# Patient Record
Sex: MALE | NOT HISPANIC OR LATINO | Employment: FULL TIME | URBAN - METROPOLITAN AREA
[De-identification: names, ages, dates, MRNs, and addresses within clinical notes are randomized per-mention and may not be internally consistent; named-entity substitution may affect disease eponyms.]

---

## 2023-10-03 ENCOUNTER — ANCILLARY PROCEDURE (OUTPATIENT)
Dept: RADIOLOGY | Facility: CLINIC | Age: 33
End: 2023-10-03
Payer: COMMERCIAL

## 2023-10-03 DIAGNOSIS — M54.12 RADICULOPATHY, CERVICAL REGION: ICD-10-CM

## 2023-10-03 PROCEDURE — 72050 X-RAY EXAM NECK SPINE 4/5VWS: CPT | Performed by: RADIOLOGY

## 2023-10-03 PROCEDURE — 72050 X-RAY EXAM NECK SPINE 4/5VWS: CPT

## 2024-02-27 ENCOUNTER — OFFICE VISIT (OUTPATIENT)
Dept: PAIN MEDICINE | Facility: CLINIC | Age: 34
End: 2024-02-27
Payer: COMMERCIAL

## 2024-02-27 ENCOUNTER — HOSPITAL ENCOUNTER (OUTPATIENT)
Dept: RADIOLOGY | Facility: CLINIC | Age: 34
Discharge: HOME | End: 2024-02-27
Payer: COMMERCIAL

## 2024-02-27 VITALS
HEART RATE: 107 BPM | RESPIRATION RATE: 20 BRPM | SYSTOLIC BLOOD PRESSURE: 122 MMHG | HEIGHT: 72 IN | WEIGHT: 298 LBS | BODY MASS INDEX: 40.36 KG/M2 | DIASTOLIC BLOOD PRESSURE: 82 MMHG

## 2024-02-27 DIAGNOSIS — M25.511 CHRONIC RIGHT SHOULDER PAIN: ICD-10-CM

## 2024-02-27 DIAGNOSIS — M54.12 CERVICAL RADICULITIS: ICD-10-CM

## 2024-02-27 DIAGNOSIS — G89.29 CHRONIC RIGHT SHOULDER PAIN: ICD-10-CM

## 2024-02-27 DIAGNOSIS — M67.911 TENDINOPATHY OF RIGHT ROTATOR CUFF: Primary | ICD-10-CM

## 2024-02-27 PROCEDURE — 99204 OFFICE O/P NEW MOD 45 MIN: CPT | Performed by: STUDENT IN AN ORGANIZED HEALTH CARE EDUCATION/TRAINING PROGRAM

## 2024-02-27 PROCEDURE — 99214 OFFICE O/P EST MOD 30 MIN: CPT | Mod: 25 | Performed by: STUDENT IN AN ORGANIZED HEALTH CARE EDUCATION/TRAINING PROGRAM

## 2024-02-27 PROCEDURE — 20611 DRAIN/INJ JOINT/BURSA W/US: CPT | Performed by: STUDENT IN AN ORGANIZED HEALTH CARE EDUCATION/TRAINING PROGRAM

## 2024-02-27 PROCEDURE — 73030 X-RAY EXAM OF SHOULDER: CPT | Mod: RT

## 2024-02-27 PROCEDURE — 2500000004 HC RX 250 GENERAL PHARMACY W/ HCPCS (ALT 636 FOR OP/ED): Performed by: STUDENT IN AN ORGANIZED HEALTH CARE EDUCATION/TRAINING PROGRAM

## 2024-02-27 PROCEDURE — 1036F TOBACCO NON-USER: CPT | Performed by: STUDENT IN AN ORGANIZED HEALTH CARE EDUCATION/TRAINING PROGRAM

## 2024-02-27 RX ORDER — ALBUTEROL SULFATE 90 UG/1
2 AEROSOL, METERED RESPIRATORY (INHALATION) EVERY 6 HOURS PRN
COMMUNITY

## 2024-02-27 RX ORDER — BUPIVACAINE HYDROCHLORIDE 5 MG/ML
3 INJECTION, SOLUTION EPIDURAL; INTRACAUDAL ONCE
Status: COMPLETED | OUTPATIENT
Start: 2024-02-27 | End: 2024-02-27

## 2024-02-27 RX ORDER — CLINDAMYCIN HYDROCHLORIDE 300 MG/1
300 CAPSULE ORAL 3 TIMES DAILY
COMMUNITY
Start: 2023-06-16

## 2024-02-27 RX ORDER — BUSPIRONE HYDROCHLORIDE 10 MG/1
10 TABLET ORAL 2 TIMES DAILY
COMMUNITY
Start: 2024-01-09

## 2024-02-27 RX ORDER — FLUTICASONE PROPIONATE AND SALMETEROL 500; 50 UG/1; UG/1
1 POWDER RESPIRATORY (INHALATION) EVERY 12 HOURS
COMMUNITY

## 2024-02-27 RX ORDER — OMEPRAZOLE 40 MG/1
CAPSULE, DELAYED RELEASE ORAL
COMMUNITY
Start: 2024-02-13

## 2024-02-27 RX ORDER — GABAPENTIN 300 MG/1
300 CAPSULE ORAL 3 TIMES DAILY
COMMUNITY
Start: 2024-02-17 | End: 2024-02-27 | Stop reason: DRUGHIGH

## 2024-02-27 RX ORDER — MULTIVITAMIN
TABLET ORAL EVERY 24 HOURS
COMMUNITY

## 2024-02-27 RX ORDER — TRIAMCINOLONE ACETONIDE 40 MG/ML
40 INJECTION, SUSPENSION INTRA-ARTICULAR; INTRAMUSCULAR ONCE
Status: COMPLETED | OUTPATIENT
Start: 2024-02-27 | End: 2024-02-27

## 2024-02-27 RX ORDER — LORAZEPAM 1 MG/1
TABLET ORAL
COMMUNITY

## 2024-02-27 RX ORDER — METHYLPREDNISOLONE 4 MG/1
TABLET ORAL EVERY 24 HOURS
COMMUNITY
Start: 2022-11-03 | End: 2024-05-23 | Stop reason: WASHOUT

## 2024-02-27 RX ORDER — PREDNISONE 10 MG/1
TABLET ORAL
COMMUNITY
Start: 2024-02-16 | End: 2024-05-23 | Stop reason: WASHOUT

## 2024-02-27 RX ORDER — FLUOXETINE HYDROCHLORIDE 20 MG/1
20 CAPSULE ORAL DAILY
COMMUNITY
Start: 2024-01-10

## 2024-02-27 RX ORDER — BUPIVACAINE HYDROCHLORIDE 5 MG/ML
2 INJECTION, SOLUTION EPIDURAL; INTRACAUDAL ONCE
Status: COMPLETED | OUTPATIENT
Start: 2024-02-27 | End: 2024-02-27

## 2024-02-27 RX ORDER — GABAPENTIN 300 MG/1
CAPSULE ORAL
Qty: 90 CAPSULE | Refills: 2 | Status: SHIPPED | OUTPATIENT
Start: 2024-02-27 | End: 2024-02-27

## 2024-02-27 RX ORDER — MONTELUKAST SODIUM 10 MG/1
10 TABLET ORAL NIGHTLY
COMMUNITY
Start: 2024-01-22 | End: 2025-01-21

## 2024-02-27 RX ORDER — HYDROCODONE BITARTRATE AND ACETAMINOPHEN 5; 325 MG/1; MG/1
1 TABLET ORAL NIGHTLY PRN
Qty: 7 TABLET | Refills: 0 | Status: SHIPPED | OUTPATIENT
Start: 2024-02-27 | End: 2024-03-06 | Stop reason: SDUPTHER

## 2024-02-27 RX ADMIN — BUPIVACAINE HYDROCHLORIDE 15 MG: 5 INJECTION, SOLUTION EPIDURAL; INTRACAUDAL; PERINEURAL at 10:39

## 2024-02-27 RX ADMIN — TRIAMCINOLONE ACETONIDE 40 MG: 40 INJECTION, SUSPENSION INTRA-ARTICULAR; INTRAMUSCULAR at 10:39

## 2024-02-27 RX ADMIN — BUPIVACAINE HYDROCHLORIDE 10 MG: 5 INJECTION, SOLUTION EPIDURAL; INTRACAUDAL; PERINEURAL at 10:38

## 2024-02-27 ASSESSMENT — LIFESTYLE VARIABLES: TOTAL SCORE: 4

## 2024-02-27 ASSESSMENT — PAIN - FUNCTIONAL ASSESSMENT: PAIN_FUNCTIONAL_ASSESSMENT: 0-10

## 2024-02-27 ASSESSMENT — PAIN SCALES - GENERAL
PAINLEVEL_OUTOF10: 10 - WORST POSSIBLE PAIN
PAINLEVEL: 10-WORST PAIN EVER

## 2024-02-27 ASSESSMENT — PATIENT HEALTH QUESTIONNAIRE - PHQ9
2. FEELING DOWN, DEPRESSED OR HOPELESS: NOT AT ALL
SUM OF ALL RESPONSES TO PHQ9 QUESTIONS 1 AND 2: 0
1. LITTLE INTEREST OR PLEASURE IN DOING THINGS: NOT AT ALL

## 2024-02-27 ASSESSMENT — PAIN DESCRIPTION - DESCRIPTORS: DESCRIPTORS: SHARP

## 2024-02-27 NOTE — PROGRESS NOTES
[facility] Pain Management  New Patient Office Visit Note 2024    Patient Information: Lucas Clayton, MRN: [id number], : 1990   Primary Care/Referring Physician: Jake Hoffman, APRN-CNP, [address] / [place] [address]     Chief Complaint: Right neck and shoulder pain  History of Pain: Mr. Lucas Clayton is a 33 y.o. male with a PMHx of GERD, asthma, anxiety, ADD who presents for neck and right shoulder/upper arm pain    He reports onset of pain around 2023. He woke up and just noticed worsening pain in his right neck and shoulder. He works as a  but did not obviously injure himself while working. States he had a cervical spine xray at that time which was unremarkable. He tried PO Prednisone multiple times, with improvement while using it, but states in the past 3 weeks it has worsened again significantly. He describes a sharp pain in his right neck which radiates down to his right shoulder, and occasionally down his right lateral arm to the level of the elbow. He also reports some stiffness in his hands. He denies any tingling or numbness in his right arm. His pain worsens when he coughs and when he tries to raise his arms up (such as when driving)    Current Pain Medications: Gabapentin 300 mg TID - minimal benefit, OTC Ibuprofen - usually 800 mg BID  Previously Tried Pain Medications: Meloxicam - was taking but stopped when he started Gabapentin    Relevant Surgeries: Denies  Injections: Denies  Physical/Occupational Therapy: Denies any recent PT.    Medications:   Current Outpatient Medications   Medication Instructions    albuterol 90 mcg/actuation inhaler 2 puffs, inhalation, Every 6 hours PRN    busPIRone (BUSPAR) 10 mg, oral, 2 times daily    clindamycin (CLEOCIN) 300 mg, oral, 3 times daily    FLUoxetine (PROZAC) 20 mg, oral, Daily    fluticasone propion-salmeteroL (Wixela Inhub) 500-50 mcg/dose diskus inhaler 1 puff, inhalation, Every 12 hours     HYDROcodone-acetaminophen (Norco) 5-325 mg tablet 1 tablet, oral, Nightly PRN    LORazepam (Ativan) 1 mg tablet take one tablet by mouth daily as needed for anxiety (#24=30 day supply per prescriber)    methylPREDNISolone (Medrol Dospak) 4 mg tablets Every 24 hours    montelukast (SINGULAIR) 10 mg, oral, Nightly    multivit with min-folic acid 0.4 mg tablet Every 24 hours    omeprazole (PriLOSEC) 40 mg DR capsule TAKE 1 CAPSULE BY MOUTH 30 MINUTES PRIOR TO MORNING MEAL    predniSONE (Deltasone) 10 mg tablet 40 mg a day for 4 days 30 mg a day for 4 days 20 mg a day for 4 days 10 mg q.day for 4 days 10 mg every other day for 4 doses      Allergies:   Allergies   Allergen Reactions    Penicillins Anaphylaxis       Past Medical & Surgical History:  Past Medical History:   Diagnosis Date    Anxiety     Depression     Neck pain     History reviewed. No pertinent surgical history.    No family history on file.  Social History     Socioeconomic History    Marital status: Single     Spouse name: Not on file    Number of children: Not on file    Years of education: Not on file    Highest education level: Not on file   Occupational History    Not on file   Tobacco Use    Smoking status: Never    Smokeless tobacco: Never   Substance and Sexual Activity    Alcohol use: Never    Drug use: Never    Sexual activity: Not on file   Other Topics Concern    Not on file   Social History Narrative    Not on file     Social Determinants of Health     Financial Resource Strain: Not on file   Food Insecurity: Not on file   Transportation Needs: Not on file   Physical Activity: Not on file   Stress: Not on file   Social Connections: Not on file   Intimate Partner Violence: Not on file   Housing Stability: Not on file       Problems, Past medical history, past surgical history, Medications, allergies, social and family history reviewed and as per the electronic medical record from today's encounter    Review of Systems:  CONST: No fever,  chills, fatigue, weight changes  EYES: No loss of vision  ENT: No hearing loss, tinnitus  CV: No chest pain, palpitations  RESP: No dyspnea, shortness of breath, cough  GI: No stool incontinence, nausea, vomiting  : No urinary incontinence  MSK: No joint swelling  SKIN: No rash, no hives  NEURO: No headache, dizziness, weakness, paresthesias  PSYCH: No anxiety, depression  HEM/LYMPH: No easy bruising or bleeding  All other systems reviewed are negative     Physical Exam:  Vitals: /82   Pulse 107   Resp 20   Ht 1.829 m (6')   Wt 135 kg (298 lb)   BMI 40.42 kg/m²   General: No apparent distress. Alert, appropriate, oriented x 3. Mood and affect normal. Speaking in full sentences.  HENT: Normocephalic, atraumatic. Hearing intact.  Eyes: Extraocular movements grossly intact. Pupils equal and round.   Neck: Supple, trachea midline.  Lungs: Symmetric respiratory excursion on visual exam, nonlabored breathing.  Extremities: No clubbing, cyanosis, or edema noted in arms or legs.  Skin: No rashes, lesions, alopecia noted on back or extremities.   MSK: No significant pain with resisted external shoulder rotation bilaterally. Reports right shoulder pain with empty can test. Reports pain with right shoulder impingement maneuvers.   Neck: Reports right neck pain with extension, rotation in either direction, lateral bending in either direction. Spurling's negative bilaterally. No tenderness over the spinous processes, cervical paraspinal muscles, or bilateral trapezius muscles.  Neuro: Alert and appropriate. Motor strength 5/5 throughout bilateral upper extremities. Sensory intact to light touch bilateral upper extremities. Gait within normal limits. Bulk and tone within normal limits.    Laboratory Data:  The following laboratory data were reviewed during this visit:   Lab Results   Component Value Date    WBC 10.3 09/03/2021    RBC 5.30 09/03/2021    HGB 16.4 09/03/2021    HCT 48.4 09/03/2021     09/03/2021  "     No results found for: \"INR\"  Lab Results   Component Value Date    CREATININE 0.9 09/03/2021       Imaging:  The following imaging impressions were reviewed by me during this visit:    -10/3/23 cervical spine xray overall unremarkable     I also personally reviewed the images from the above studies myself. These images and my interpretation of them contributed to the management and decision making of the patient's medical plan.    ASSESSMENT:  Mr. Lucas Clayton is a 33 y.o. male with right neck and shoulder/upper arm pain that is consistent with:    1. Tendinopathy of right rotator cuff    2. Cervical radiculitis    3. Chronic right shoulder pain        PLAN:    Diagnostics:   - I reviewed his cervical spine xray which was unremarkable. Recommend a right shoulder xray    Physical Therapy and Rehabilitation:     - Referral to PT for his neck and right shoulder provided today    Psychologically:  - No needs at this time    Medications  - No changes to medication today. He has tried multiple NSAID's including Meloxicam, Diclofenac, and PO steroids without durable pain relief    Duration  - 5 months    Interventions:  - I suspect his pain is secondary to right rotator cuff tendinopathy with some possibility of cervical radiculitis. His pain remains fairly severe and is affecting his sleep and ability to work. He has not improved in 5 months with conservative care including activity modification and NSAID's. At this juncture, I believe he would benefit from an ultrasound guided right subacromial bursa injection. Risks, benefits, and alternatives discussed. He would like to proceed. This was performed today, as noted below    Joint Injection/Aspiration    Date/Time: 2/27/2024 10:25 AM    Performed by: Ayo Hawthorne MD  Authorized by: Ayo Hawthorne MD    Consent:     Consent obtained:  Written    Consent given by:  Patient    Risks, benefits, and alternatives were discussed: yes      Risks discussed:  Bleeding, " infection and pain    Alternatives discussed:  Alternative treatment  Universal protocol:     Procedure explained and questions answered to patient or proxy's satisfaction: yes      Relevant documents present and verified: yes      Test results available: no      Imaging studies available: yes      Required blood products, implants, devices, and special equipment available: yes      Site/side marked: yes      Immediately prior to procedure, a time out was called: yes      Patient identity confirmed:  Verbally with patient  Location:     Location:  Shoulder    Shoulder:  R subacromial bursa  Anesthesia:     Anesthesia method:  Local infiltration    Local anesthetic:  Bupivacaine 0.5% w/o epi  Procedure details:     Preparation: Patient was prepped and draped in usual sterile fashion      Needle gauge:  22 G    Ultrasound guidance: yes      Approach:  Anterior    Steroid injected: yes      Specimen collected: no    Post-procedure details:     Dressing:  Adhesive bandage    Procedure completion:  Tolerated well, no immediate complications  Comments:      Ultrasound guided right subacromial bursa injection  After informed written consent was obtained, the patient was placed in the sitting position with the right arm resting in the lap. The correct site and laterality were confirmed with the patient and marked. The skin was prepped with chlorhexidine, allowed to dry for a minimum of 3 minutes, and draped in a sterile fashion.    A linear ultrasound probe (10-12mHz) was positioned over the left shoulder. The supraspinatus tendon and the subacromial space and bursa were located. Next, a 25 gauge 1.5 inch needle was used to anesthesize the skin with 2 mL of bupivacaine 0.5%. Next a 22 gauge 1.5 inch needle was advanced slowly until its tip was visualized under ultrasound to be in the right subacromial/subdeltoid bursal space. Next, a mixture of 40 mg triamcinolone diluted in 3 mL of bupivacaine 0.5% was injected and the  needle was removed.   The skin was cleansed and a sterile bandage was applied. The patient tolerated the procedure well and no complications were encountered.    Performed by: Ayo Hawthorne MD  Authorized by: Ayo Hawthorne MD      Comments: Ultrasound guidance was used for right subacromial bursa injection        Sincerely,  Ayo Hawthorne MD  [facility] - [place]

## 2024-03-05 ENCOUNTER — TELEPHONE (OUTPATIENT)
Dept: PAIN MEDICINE | Facility: CLINIC | Age: 34
End: 2024-03-05
Payer: COMMERCIAL

## 2024-03-05 DIAGNOSIS — M67.911 TENDINOPATHY OF RIGHT ROTATOR CUFF: ICD-10-CM

## 2024-03-05 DIAGNOSIS — M54.12 CERVICAL RADICULITIS: ICD-10-CM

## 2024-03-05 DIAGNOSIS — G89.29 CHRONIC RIGHT SHOULDER PAIN: ICD-10-CM

## 2024-03-05 DIAGNOSIS — M25.511 CHRONIC RIGHT SHOULDER PAIN: ICD-10-CM

## 2024-03-05 NOTE — TELEPHONE ENCOUNTER
Phone call from patient stating his shoulder hasn't improved any. (He had his xray done 2/27/24).

## 2024-03-06 RX ORDER — HYDROCODONE BITARTRATE AND ACETAMINOPHEN 5; 325 MG/1; MG/1
1 TABLET ORAL EVERY 8 HOURS PRN
Qty: 21 TABLET | Refills: 0 | Status: SHIPPED | OUTPATIENT
Start: 2024-03-06 | End: 2024-03-13

## 2024-03-06 RX ORDER — TIZANIDINE 4 MG/1
4-8 TABLET ORAL 2 TIMES DAILY PRN
Qty: 60 TABLET | Refills: 1 | Status: SHIPPED | OUTPATIENT
Start: 2024-03-06 | End: 2024-04-24 | Stop reason: SDUPTHER

## 2024-03-06 NOTE — TELEPHONE ENCOUNTER
Called patient to discuss. Will provide an additional small Rx for Norco to help with pain and sleep, as well as Tizanidine. He is planning to start PO on 3/20/24

## 2024-03-19 NOTE — PROGRESS NOTES
Physical Therapy Evaluation    Patient Name: Lucas Clayton  MRN: [id number]  Evaluation Date: 3/20/2024  Time Calculation  Start Time: 1805  Stop Time: 1855  Time Calculation (min): 50 min  PT Evaluation Time Entry  PT Evaluation (Moderate) Time Entry: 25     PT Therapeutic Procedures Time Entry  Therapeutic Exercise Time Entry: 15    1. Tendinopathy of right rotator cuff  Referral to Physical Therapy      2. Cervical radiculitis  Referral to Physical Therapy           Subjective   General:  Patient is a 32 yo male  with diagnosis of right RC tendinopathy.  Patient with chief complaint of right shoulder and neck pain.  Patient reports 6 month history of symptoms.  Patient reports waking with stiff neck right > left  6 months ago.  Patient with improvement with oral steroids; no relief with injection.  Patient continues to reports right sided cervical and shoulder symptoms  Patient is motivated to participate.    Precautions:  none    Relevant PMH:  GERD  Asthma  Anxiety  ADD      Pain:  Neck/shoulder   Worse 10/10- PM, lifting at work  Better 7/10- at rest, off days     Home Living:       Occupation: full time job doing   Work tasks: lifting/repetition  Hobbies/activities: working on cars    Prior Function Per Pt/Caregiver Report:  Physical work day  Works on cars for recreation      Imaging:  X-ray cervical spine  FINDINGS:   No acute fracture or focal malalignment. Minimal multilevel   uncovertebral hypertrophy and facet arthropathy     X-ray right shoulder  IMPRESSION:  No evidence for acute osseous abnormality. No significant bony  abnormality of the right shoulder is identified.      Objective   Posture:  FHP     Range of Motion:  Cervical AROM Range   Flexion WNL   Extension 25% limited- pain   R rotation 48 degrees - pain   L rotation 50 degrees - pain   R sidebend 25 degrees- pain   L sidebend 30 degrees      Shoulder AROM L R   Flexion WFL deg 140 deg   Extension WFL deg WFL deg   Abduction WFL deg  120 deg   External Rotation 85 deg 80 deg   Internal Rotation T10 deg T10 deg         Strength:  Shoulder MMT L R   Flexion 4+/5 4/5   Extension 5/5 5/5   Abduction 4+/5 4/5   External Rotation 5/5 5/5   Internal Rotation 5/5 5/5      Right 62  Left 76 psi  Flexibility:     Palpation:  ++ tenderness to palpate right UT/lateral shoulder     Special Tests:  Full can - negative  Empty can- positive     Outcome Measures:  SPADI  97/100    Assessment  Pt is a 33 y.o. male who presents with impairments of right shoulder pain, limited ROM and RC weakness. These impairments have led to functional limitations including impaired work day/recreation. Pt would benefit from skilled physical therapy intervention to improve above impairments and facilitate return to function.    Plan  1x/week  Up to 7 visits  Therapeutic exercise  Manual/dry needling  Ultrasound    Plan for next visit:   Add US/manual  Progress HEP/therapeutic exercise    OP EDUCATION:  HEP    Today's Treatment:  Therapeutic Exercise  HEP to be completed daily, exercises include:  Door way stretch for chest/shoulders  GTB standing bilateral ER  GTB standing bilateral HABD      Goals:  STG(3 visits)   Independent with HEP    LTG(7 visits)   SPADI to < 25 % impaired   MMT right RC elevation to 4+/5   Patient to perform ADLs/work with pain <3/10   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

## 2024-03-20 ENCOUNTER — EVALUATION (OUTPATIENT)
Dept: PHYSICAL THERAPY | Facility: CLINIC | Age: 34
End: 2024-03-20
Payer: COMMERCIAL

## 2024-03-20 DIAGNOSIS — M54.12 CERVICAL RADICULITIS: ICD-10-CM

## 2024-03-20 DIAGNOSIS — M67.911 TENDINOPATHY OF RIGHT ROTATOR CUFF: Primary | ICD-10-CM

## 2024-03-20 PROCEDURE — 97162 PT EVAL MOD COMPLEX 30 MIN: CPT | Mod: GP

## 2024-03-20 PROCEDURE — 97110 THERAPEUTIC EXERCISES: CPT | Mod: GP

## 2024-03-26 NOTE — PROGRESS NOTES
[facility] Pain Management  Follow Up Office Visit Note 3/27/2024    Patient Information: Lucas Clayton, MRN: [id number], : 1990   Primary Care/Referring Physician: Jake Hoffman, APRN-CNP, [address] / [place] [address]     Chief Complaint: Right neck and shoulder pain    Interval History: Mr. Clayton presents today for follow up. At hist last visit I performed a right subacromial bursa injection, referred him to PT, and ordered a right shoulder xray today    Today he reports no changes since last seen. His pain did not improve at all after subacromial bursa injection. He had his initial evaluation with PT but can't start until late April, so he is doing exercises at home. I also started Tizanidine in the interim which is providing mild pain relief with no side effects. His shoulder xray was unremarkable    He has restarted Meloxicam which is improving some of his hand pain but not his shoulder pain    Brief History of Pain: Mr. Lucas Clayton is a 33 y.o. male with a PMHx of GERD, asthma, anxiety, ADD who presents for neck and right shoulder/upper arm pain    He reports onset of pain around 2023. He woke up and just noticed worsening pain in his right neck and shoulder. He works as a  but did not obviously injure himself while working. States he had a cervical spine xray at that time which was unremarkable. He tried PO Prednisone multiple times, with improvement while using it, but states in the past 3 weeks it has worsened again significantly. He describes a sharp pain in his right neck which radiates down to his right shoulder, and occasionally down his right lateral arm to the level of the elbow. He also reports some stiffness in his hands. He denies any tingling or numbness in his right arm. His pain worsens when he coughs and when he tries to raise his arms up (such as when driving)    Current Pain Medications: Meloxicam - takes this 2-3 times a week for his hand pain, Ibuprofen  600 mg at most once daily, Tizanidine 4-8 mg BID PRN - take 4 mg BID  Previously Tried Pain Medications: Gabapentin - minimal pain relief    Relevant Surgeries: Denies  Injections: Right subacromial bursa injection - minimal improvement  Physical/Occupational Therapy: Has started PT and is doing exercises at home    Medications:   Current Outpatient Medications   Medication Instructions    albuterol 90 mcg/actuation inhaler 2 puffs, inhalation, Every 6 hours PRN    busPIRone (BUSPAR) 10 mg, oral, 2 times daily    clindamycin (CLEOCIN) 300 mg, oral, 3 times daily    FLUoxetine (PROZAC) 20 mg, oral, Daily    fluticasone propion-salmeteroL (Wixela Inhub) 500-50 mcg/dose diskus inhaler 1 puff, inhalation, Every 12 hours    HYDROcodone-acetaminophen (Norco) 5-325 mg tablet 1 tablet, oral, Every 6 hours PRN    LORazepam (Ativan) 1 mg tablet take one tablet by mouth daily as needed for anxiety (#24=30 day supply per prescriber)    methylPREDNISolone (Medrol Dospak) 4 mg tablets Every 24 hours    montelukast (SINGULAIR) 10 mg, oral, Nightly    multivit with min-folic acid 0.4 mg tablet Every 24 hours    omeprazole (PriLOSEC) 40 mg DR capsule TAKE 1 CAPSULE BY MOUTH 30 MINUTES PRIOR TO MORNING MEAL    predniSONE (Deltasone) 10 mg tablet 40 mg a day for 4 days 30 mg a day for 4 days 20 mg a day for 4 days 10 mg q.day for 4 days 10 mg every other day for 4 doses    tiZANidine (ZANAFLEX) 4-8 mg, oral, 2 times daily PRN      Allergies:   Allergies   Allergen Reactions    Penicillins Anaphylaxis    Doxycycline Rash       Past Medical & Surgical History:  Past Medical History:   Diagnosis Date    Anxiety     Depression     Neck pain     History reviewed. No pertinent surgical history.    Family History   Problem Relation Name Age of Onset    Breast cancer Mother      Asthma Father       Social History     Socioeconomic History    Marital status: Single     Spouse name: Not on file    Number of children: Not on file    Years of  education: Not on file    Highest education level: Not on file   Occupational History    Not on file   Tobacco Use    Smoking status: Never    Smokeless tobacco: Never   Substance and Sexual Activity    Alcohol use: Never    Drug use: Never    Sexual activity: Not on file   Other Topics Concern    Not on file   Social History Narrative    Not on file     Social Determinants of Health     Financial Resource Strain: Not on file   Food Insecurity: Not on file   Transportation Needs: Not on file   Physical Activity: Not on file   Stress: Not on file   Social Connections: Not on file   Intimate Partner Violence: Not on file   Housing Stability: Not on file       Problems, Past medical history, past surgical history, Medications, allergies, social and family history reviewed and as per the electronic medical record from today's encounter    Review of Systems:  CONST: No fever, chills, fatigue, weight changes  EYES: No loss of vision  ENT: No hearing loss, tinnitus  CV: No chest pain, palpitations  RESP: No dyspnea, shortness of breath, cough  GI: No stool incontinence, nausea, vomiting  : No urinary incontinence  MSK: No joint swelling  SKIN: No rash, no hives  NEURO: No headache, dizziness, weakness, paresthesias  PSYCH: No anxiety, depression  HEM/LYMPH: No easy bruising or bleeding  All other systems reviewed are negative     Physical Exam:  Vitals: /79   Pulse 97   Resp 20   Ht 1.829 m (6')   Wt 135 kg (298 lb)   SpO2 98%   BMI 40.42 kg/m²   General: No apparent distress. Alert, appropriate, oriented x 3. Mood and affect normal. Speaking in full sentences.  HENT: Normocephalic, atraumatic. Hearing intact.  Eyes: Extraocular movements grossly intact. Pupils equal and round.   Neck: Supple, trachea midline.  Lungs: Symmetric respiratory excursion on visual exam, nonlabored breathing.  Extremities: No clubbing, cyanosis, or edema noted in arms or legs.  Skin: No rashes, lesions, alopecia noted on back or  "extremities.   MSK: No significant pain with resisted external shoulder rotation bilaterally. Reports right shoulder pain with empty can test. Reports pain with right shoulder impingement maneuvers.   Neuro: Alert and appropriate. Gait within normal limits. Bulk and tone within normal limits.    Laboratory Data:  The following laboratory data were reviewed during this visit:   Lab Results   Component Value Date    WBC 10.3 09/03/2021    RBC 5.30 09/03/2021    HGB 16.4 09/03/2021    HCT 48.4 09/03/2021     09/03/2021      No results found for: \"INR\"  Lab Results   Component Value Date    CREATININE 0.9 09/03/2021       Imaging:  The following imaging impressions were reviewed by me during this visit:    -2/24/24 right shoulder xray overall unremarkable  -10/3/23 cervical spine xray overall unremarkable     I also personally reviewed the images from the above studies myself. These images and my interpretation of them contributed to the management and decision making of the patient's medical plan.    ASSESSMENT:  Mr. Lucas Clayton is a 33 y.o. male with right neck and shoulder/upper arm pain that is consistent with:    1. Chronic right shoulder pain    2. Tendinopathy of right rotator cuff    3. Primary osteoarthritis, right shoulder        PLAN:    Diagnostics:   - I reviewed his cervical spine xray and right shoulder xray which was unremarkable. Would consider a right shoulder MRI in the future if his pain does not improve with additional treatment, to evaluate for labral tear or rotator cuff tendinopathy    Physical Therapy and Rehabilitation:     - Referral to PT for his neck and right shoulder provided previously. He has had his initial evaluation but can't start regular therapy until the end of 04/2024. He is doing PT directed home exercises currently    Psychologically:  - No needs at this time    Medications  - No changes to medication today. He has tried multiple NSAID's including Meloxicam, Diclofenac, " and PO steroids without durable pain relief. I did provide him one additional prescription for Norco but will not provide any additional refills of this medication.     Duration  - 6 months    Interventions:  - I suspect his pain is secondary to right rotator cuff tendinopathy with some possibility of labral tear or cervical radiculitis. His pain remains fairly severe and is affecting his sleep and ability to work. He has not improved in 5 months with conservative care including activity modification and NSAID's. He underwent a right subacromial bursa injection with minimal improvement in his pain. At this juncture, I believe he would benefit from a right intra-articular shoulder joint injection utilizing live fluoroscopy and local anesthesia. Risks, benefits, and alternatives discussed. He would like to proceed.          Sincerely,  Ayo Hawthorne MD  [facility] - [place]

## 2024-03-27 ENCOUNTER — OFFICE VISIT (OUTPATIENT)
Dept: PAIN MEDICINE | Facility: CLINIC | Age: 34
End: 2024-03-27
Payer: COMMERCIAL

## 2024-03-27 VITALS
HEART RATE: 97 BPM | HEIGHT: 72 IN | WEIGHT: 298 LBS | RESPIRATION RATE: 20 BRPM | BODY MASS INDEX: 40.36 KG/M2 | OXYGEN SATURATION: 98 % | SYSTOLIC BLOOD PRESSURE: 140 MMHG | DIASTOLIC BLOOD PRESSURE: 79 MMHG

## 2024-03-27 DIAGNOSIS — M19.011 PRIMARY OSTEOARTHRITIS, RIGHT SHOULDER: ICD-10-CM

## 2024-03-27 DIAGNOSIS — G89.29 CHRONIC RIGHT SHOULDER PAIN: Primary | ICD-10-CM

## 2024-03-27 DIAGNOSIS — M67.911 TENDINOPATHY OF RIGHT ROTATOR CUFF: ICD-10-CM

## 2024-03-27 DIAGNOSIS — M25.511 CHRONIC RIGHT SHOULDER PAIN: Primary | ICD-10-CM

## 2024-03-27 PROBLEM — F41.0 PANIC ATTACK: Status: ACTIVE | Noted: 2024-03-27

## 2024-03-27 PROBLEM — H66.001 NON-RECURRENT ACUTE SUPPURATIVE OTITIS MEDIA OF RIGHT EAR WITHOUT SPONTANEOUS RUPTURE OF TYMPANIC MEMBRANE: Status: ACTIVE | Noted: 2023-12-12

## 2024-03-27 PROBLEM — J45.909 ASTHMA (HHS-HCC): Status: ACTIVE | Noted: 2023-07-06

## 2024-03-27 PROBLEM — R37 SEXUAL DYSFUNCTION: Status: ACTIVE | Noted: 2023-09-28

## 2024-03-27 PROBLEM — G47.26 SHIFT WORK SLEEP DISORDER: Status: ACTIVE | Noted: 2024-03-27

## 2024-03-27 PROBLEM — R41.840 ATTENTION DISTURBANCE: Status: ACTIVE | Noted: 2024-03-27

## 2024-03-27 PROBLEM — M54.9 CHRONIC BACK PAIN: Status: ACTIVE | Noted: 2023-09-28

## 2024-03-27 PROBLEM — M54.40 ACUTE BACK PAIN WITH SCIATICA: Status: ACTIVE | Noted: 2024-03-27

## 2024-03-27 PROBLEM — F41.9 ANXIETY: Status: ACTIVE | Noted: 2023-07-06

## 2024-03-27 PROBLEM — J45.30 MILD PERSISTENT ASTHMA WITHOUT COMPLICATION (HHS-HCC): Status: ACTIVE | Noted: 2024-03-27

## 2024-03-27 PROBLEM — J06.9 UPPER RESPIRATORY TRACT INFECTION: Status: ACTIVE | Noted: 2023-12-12

## 2024-03-27 PROBLEM — R03.0 ELEVATED BP WITHOUT DIAGNOSIS OF HYPERTENSION: Status: ACTIVE | Noted: 2023-12-12

## 2024-03-27 PROBLEM — G47.26 CIRCADIAN RHYTHM SLEEP DISORDER, SHIFT WORK TYPE: Status: ACTIVE | Noted: 2024-03-27

## 2024-03-27 PROBLEM — F98.8 ADD (ATTENTION DEFICIT DISORDER) WITHOUT HYPERACTIVITY: Status: ACTIVE | Noted: 2023-07-06

## 2024-03-27 PROBLEM — F17.200 SMOKER: Status: ACTIVE | Noted: 2023-09-28

## 2024-03-27 PROCEDURE — 1036F TOBACCO NON-USER: CPT | Performed by: STUDENT IN AN ORGANIZED HEALTH CARE EDUCATION/TRAINING PROGRAM

## 2024-03-27 PROCEDURE — 99214 OFFICE O/P EST MOD 30 MIN: CPT | Performed by: STUDENT IN AN ORGANIZED HEALTH CARE EDUCATION/TRAINING PROGRAM

## 2024-03-27 RX ORDER — HYDROCODONE BITARTRATE AND ACETAMINOPHEN 5; 325 MG/1; MG/1
1 TABLET ORAL EVERY 6 HOURS PRN
Qty: 28 TABLET | Refills: 0 | Status: SHIPPED | OUTPATIENT
Start: 2024-03-27 | End: 2024-04-03

## 2024-03-27 ASSESSMENT — PAIN SCALES - GENERAL
PAINLEVEL: 8
PAINLEVEL_OUTOF10: 8

## 2024-03-27 ASSESSMENT — PAIN - FUNCTIONAL ASSESSMENT: PAIN_FUNCTIONAL_ASSESSMENT: 0-10

## 2024-03-27 ASSESSMENT — PATIENT HEALTH QUESTIONNAIRE - PHQ9
SUM OF ALL RESPONSES TO PHQ9 QUESTIONS 1 AND 2: 0
1. LITTLE INTEREST OR PLEASURE IN DOING THINGS: NOT AT ALL
2. FEELING DOWN, DEPRESSED OR HOPELESS: NOT AT ALL

## 2024-03-27 ASSESSMENT — PAIN DESCRIPTION - DESCRIPTORS: DESCRIPTORS: ACHING;SHARP

## 2024-03-27 NOTE — PROGRESS NOTES
MEDICATION NAME: Norco  STRENGTH: 5/325 mg  LAST FILL DATE: 2024  DATE LAST TAKEN: 2024  QUANTITY FILLED: 21  QUANTITY REMAININ  COUNT COMPLETED BY: SERGIO RUIZ MA and JOSE MCCONNELL RN      UDS LAST COMPLETED:   CONTROLLED SUBSTANCES AGREEMENT LAST SIGNED:   ORT LAST COMPLETED:  Modified [employer] disability form filled out annually.

## 2024-03-29 ENCOUNTER — HOSPITAL ENCOUNTER (OUTPATIENT)
Dept: RADIOLOGY | Facility: CLINIC | Age: 34
Discharge: HOME | End: 2024-03-29
Payer: COMMERCIAL

## 2024-03-29 ENCOUNTER — APPOINTMENT (OUTPATIENT)
Dept: RADIOLOGY | Facility: CLINIC | Age: 34
End: 2024-03-29
Payer: COMMERCIAL

## 2024-03-29 DIAGNOSIS — M25.511 PAIN IN RIGHT SHOULDER: ICD-10-CM

## 2024-04-12 ENCOUNTER — OUTSIDE PROCEDURE (OUTPATIENT)
Dept: PAIN MEDICINE | Facility: CLINIC | Age: 34
End: 2024-04-12

## 2024-04-12 ENCOUNTER — HOSPITAL ENCOUNTER (OUTPATIENT)
Dept: RADIOLOGY | Facility: CLINIC | Age: 34
Discharge: HOME | End: 2024-04-12
Payer: COMMERCIAL

## 2024-04-12 DIAGNOSIS — M25.511 ACUTE PAIN OF RIGHT SHOULDER: Primary | ICD-10-CM

## 2024-04-12 DIAGNOSIS — M25.511 PAIN IN RIGHT SHOULDER: ICD-10-CM

## 2024-04-12 DIAGNOSIS — M67.911 TENDINOPATHY OF RIGHT ROTATOR CUFF: ICD-10-CM

## 2024-04-12 DIAGNOSIS — M67.911 DISORDER OF RIGHT ROTATOR CUFF: ICD-10-CM

## 2024-04-12 PROCEDURE — 20610 DRAIN/INJ JOINT/BURSA W/O US: CPT | Performed by: STUDENT IN AN ORGANIZED HEALTH CARE EDUCATION/TRAINING PROGRAM

## 2024-04-12 NOTE — PROGRESS NOTES
Procedure Note: 2024    PROCEDURE NAME: Right shoulder joint injection    Patient Information: Lucas Clayton, MRN (from [facility]) [id number], : 1990  Chief Complaint: Right shoulder pain    Pain Diagnosis: The diagnosis of Acute pain of right shoulder    Disorder of right rotator cuff  has been made given the patient's clinical evaluation at prior evaluation.      DESCRIPTION OF PROCEDURE:    Lucas Clayton was brought to the procedure room. The assistant obtained vital signs, which were found to be stable. He was then informed of the procedure, its benefits, and its risks, and his questions were answered regarding the procedure. Written informed consent was obtained from the patient. Immediately prior to starting the procedure, a time-out safety check was conducted and the patient's identification, procedure name, and procedure site were confirmed with the patient.    Right Glenohumeral Joint Injection:  After informed written consent was obtained, the patient was placed in supine position. Monitoring of pulse oximetry, heart rate, and blood pressure was done pre-procedure, and post-procedure. During the procedure the patient was monitored with continuous pulse-ox. A time out was performed before the beginning of the procedure. The correct site and laterality were marked. The skin was prepped with chlorhexidine, allowed to dry for 3 minutes, and draped in a sterile fashion    AP fluoroscopy was used to identify and trang the right glenohumeral joint, with the target overlying the humeral head. Next, a 25-gauge 1.5 inch needle was used to inject 2 mL of 1% lidocaine into the overlying skin and musculature. A 3.5 inch 22 gauge spinal needle was then advanced until bone contacted and then slightly retracted. Next, after negative aspiration for blood, 0.5 mL of Omnipaque contrast was injected to confirm spread along the joint space. Then 4 mL of a mixture of 40 mg of triamcinolone (40 mg/mL) diluted in 4 mL of  ropivacaine 0.2% was injected. The needle was withdrawn      Anesthesia: local anesthesia   Complications: none      Ayo Hawthorne MD

## 2024-04-19 ENCOUNTER — APPOINTMENT (OUTPATIENT)
Dept: PHYSICAL THERAPY | Facility: CLINIC | Age: 34
End: 2024-04-19
Payer: COMMERCIAL

## 2024-04-19 NOTE — PROGRESS NOTES
Physical Therapy Treatment    Patient Name: Lucas Clayton  MRN: [id number]  Encounter date:  4/19/2024             Visit Number:  Visit count could not be calculated. Make sure you are using a visit which is associated with an episode. (including evaluation)  Planned total visits: ***  Visits Authorized/Insurance Coverage:  ***    Current Problem  Problem List Items Addressed This Visit    None      Surgery  ***    Surgery date:  ***    Precautions       Pain       Subjective  General  Patient for injection under flouroscopy 4/12        ***    Objective  ***  Extremity/Trunk Assessment  {Extremity/Trunk Assessments:60095}    {Spine,UE,LE,HAND,LYMPHEDEMA:67108}    Treatment:  {PT Treatments:50124}  ***    Aquatic Therapy:        Current HEP:  ***    Activity tolerance:  {Activity:63038}    OP EDUCATION:       Assessment:     Pt's response to treatment:  ***  Areas of improvements:  ***  Limitations/deficits:  ***    Pain end of session: ***    Plan:     {BASPLAN:70063}    Assessment of current progress against goals:  {BASPTNOTEGOALASSESSMENT:52505}    Goals:

## 2024-04-23 PROBLEM — M25.511 ACUTE PAIN OF RIGHT SHOULDER: Status: ACTIVE | Noted: 2024-04-23

## 2024-04-23 PROBLEM — M67.911 DISORDER OF RIGHT ROTATOR CUFF: Status: ACTIVE | Noted: 2024-04-23

## 2024-04-23 NOTE — PROGRESS NOTES
Subjective   Patient ID: Lucas Clayton is a 33 y.o. male who presents for Pain Management.    HPI 34 YO Male with Chronic Right Shoulder Pain, Tendinopathy of the right rotator cuff and Primary OA of the right shoulder. He presents as a follow-up to his Right Glenohumeral Joint Injection by Dr. Hawthorne on 4/12/2024.  Lucas reports 95% relief to his right shoulder.  He does continue to have back spasms for which she is asking for a refill of his tizanidine.    Review of Systems Unless noted in the HPI all other systems have been reviewed and are negative for complaint.    Objective   Physical Exam  General- No acute distress, well appearing and well nourished. Obese  Eyes Conjunctiva and lids: No erythema, swelling or discharge  Neck - Supple, no cervical lymphadenopathy.   Pulmonary - Respiratory effort: Normal respiration.   Cardiovascular - Normal rate and rhythm.  Examination of extremities for edema and/or varicosities: No peripheral edema  Abdomen: Soft, Non-tender, non-distended, no abdominal masses.   Musculoskeletal - Some crepitus to the right shoulder but ROM intact.  Skin - Skin and subcutaneous tissue: Normal without rashes or lesions.  Neurologic - Reflexes: Normal. Coordination: Normal gait   Psychiatric - Orientation to person, place, and time: Normal. Mood and affect: Normal.    Assessment/Plan   Problem List Items Addressed This Visit       Acute pain of right shoulder - Primary    Disorder of right rotator cuff     Other Visit Diagnoses       Chronic right shoulder pain        Relevant Medications    tiZANidine (Zanaflex) 4 mg tablet          TREATMENT PLAN:  I had a nice discussion with the patient today and our plan will be as follows:  Radiology: No new imaging to review at this time.   Physically: Encouraged patient to continue with increased physical activity as able.   Psychologically: No acute psychological needs at this time.    Medication: Had been given Infinisource by [facility] but last note said no  additional fills.  Will refill tizanidine as previously ordered.  Duration: >6 mo  Intervention: Patient is s/p Right Glenohumeral Joint Injection by Dr. Hawthorne on 4/12/2024 with 95% improvement in pain.  Patient will follow-up with us on an as-needed basis.

## 2024-04-24 ENCOUNTER — OFFICE VISIT (OUTPATIENT)
Dept: PAIN MEDICINE | Facility: CLINIC | Age: 34
End: 2024-04-24
Payer: COMMERCIAL

## 2024-04-24 ENCOUNTER — APPOINTMENT (OUTPATIENT)
Dept: PHYSICAL THERAPY | Facility: CLINIC | Age: 34
End: 2024-04-24
Payer: COMMERCIAL

## 2024-04-24 VITALS
HEIGHT: 72 IN | DIASTOLIC BLOOD PRESSURE: 78 MMHG | OXYGEN SATURATION: 98 % | HEART RATE: 97 BPM | BODY MASS INDEX: 40.36 KG/M2 | SYSTOLIC BLOOD PRESSURE: 142 MMHG | WEIGHT: 298 LBS

## 2024-04-24 DIAGNOSIS — M25.511 CHRONIC RIGHT SHOULDER PAIN: ICD-10-CM

## 2024-04-24 DIAGNOSIS — M25.511 ACUTE PAIN OF RIGHT SHOULDER: Primary | ICD-10-CM

## 2024-04-24 DIAGNOSIS — M67.911 DISORDER OF RIGHT ROTATOR CUFF: ICD-10-CM

## 2024-04-24 DIAGNOSIS — G89.29 CHRONIC RIGHT SHOULDER PAIN: ICD-10-CM

## 2024-04-24 PROCEDURE — 99214 OFFICE O/P EST MOD 30 MIN: CPT | Performed by: NURSE PRACTITIONER

## 2024-04-24 PROCEDURE — 1036F TOBACCO NON-USER: CPT | Performed by: NURSE PRACTITIONER

## 2024-04-24 RX ORDER — TIZANIDINE 4 MG/1
8 TABLET ORAL 2 TIMES DAILY PRN
Qty: 120 TABLET | Refills: 1 | Status: SHIPPED | OUTPATIENT
Start: 2024-04-24 | End: 2024-05-24

## 2024-04-24 ASSESSMENT — PATIENT HEALTH QUESTIONNAIRE - PHQ9
2. FEELING DOWN, DEPRESSED OR HOPELESS: NOT AT ALL
1. LITTLE INTEREST OR PLEASURE IN DOING THINGS: NOT AT ALL
SUM OF ALL RESPONSES TO PHQ9 QUESTIONS 1 AND 2: 0

## 2024-04-24 ASSESSMENT — PAIN - FUNCTIONAL ASSESSMENT: PAIN_FUNCTIONAL_ASSESSMENT: 0-10

## 2024-04-24 ASSESSMENT — PAIN SCALES - GENERAL
PAINLEVEL: 3
PAINLEVEL_OUTOF10: 0 - NO PAIN

## 2024-04-24 ASSESSMENT — PAIN DESCRIPTION - DESCRIPTORS: DESCRIPTORS: ACHING

## 2024-04-24 NOTE — PROGRESS NOTES
Physical Therapy Treatment    Patient Name: Lucas Clayton  MRN: [id number]  Encounter date:  4/24/2024             Visit Number:  Visit count could not be calculated. Make sure you are using a visit which is associated with an episode. (including evaluation)  Planned total visits: 7  Visits Authorized/Insurance Coverage:  1500 DED- NOT MET , 80/20 COVERAGE, 30V PT/OT/ ST/CARDIO, AUXIANT NO AUTH REF# [id number]    Current Problem  Problem List Items Addressed This Visit    None        Precautions  none    Pain       Subjective  General  Patients s/p injection under flouroscopy 4/12        ***    Objective  ***  Extremity/Trunk Assessment  {Extremity/Trunk Assessments:53886}    {Spine,UE,LE,HAND,LYMPHEDEMA:03718}    Treatment:  Ultrasound    Manual  Current HEP:    Therapeutic Exercise  HEP to be completed daily, exercises include:  Door way stretch for chest/shoulders  GTB standing bilateral ER  GTB standing bilateral HABD       Activity tolerance:  {Activity:53250}    OP EDUCATION:       Assessment:     Pt's response to treatment:  ***  Areas of improvements:  ***  Limitations/deficits:  ***    Pain end of session: ***    Plan:     {BASPLAN:19784}    Assessment of current progress against goals:  {BASPTNOTEGOALASSESSMENT:07566}       Goals:  STG(3 visits)   Independent with HEP     LTG(7 visits)   SPADI to < 25 % impaired   MMT right RC elevation to 4+/5   Patient to perform ADLs/work with pain <3/10   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

## 2024-05-01 ENCOUNTER — TREATMENT (OUTPATIENT)
Dept: PHYSICAL THERAPY | Facility: CLINIC | Age: 34
End: 2024-05-01
Payer: COMMERCIAL

## 2024-05-01 DIAGNOSIS — M67.911 TENDINOPATHY OF RIGHT ROTATOR CUFF: Primary | ICD-10-CM

## 2024-05-01 PROCEDURE — 97110 THERAPEUTIC EXERCISES: CPT | Mod: GP

## 2024-05-01 NOTE — PROGRESS NOTES
"    Physical Therapy Treatment    Patient Name: Lucas Clayton  MRN: [id number]  Encounter date:  5/1/2024  Time Calculation  Start Time: 1730  Stop Time: 1805  Time Calculation (min): 35 min     PT Therapeutic Procedures Time Entry  Therapeutic Exercise Time Entry: 32    Visit Number:  2 (including evaluation)  Planned total visits: 7  Visits Authorized/Insurance Coverage:  1500 DED- NOT MET , 80/20 COVERAGE, 30V PT/OT/ ST/CARDIO, AUXIANT NO AUTH REF# [id number]    Current Problem  Problem List Items Addressed This Visit             ICD-10-CM    Tendinopathy of right rotator cuff - Primary M67.911           Precautions  none     Relevant PMH:  GERD  Asthma  Anxiety  ADD       Pain  Improved right shoulder pain; essentially pain free    Subjective  General  Patient s/p injection right shoulder 4/12- improved symptoms/function.    Objective  AROM right shoulder WNL   MMT right RC 4/5    Treatment:      Therapeutic Exercise  UBE L3 x 4' F/B  Standing1# wand flexion x 20  Door way stretch 20\" x 3  bilateral ER purple TB 2 x 10  standing bilateral HABD purple TB    Purple TB ADD 2 x 10  Purple TB EXT 2 x 10  Purple TB IR 2 x 10    Current HEP:  HEP to be completed daily, exercises include:  Door way stretch for chest/shoulders  GTB standing bilateral ER  GTB standing bilateral HABD    Purple TB ADD  Purple TB EXT  Purple TB IR        Activity tolerance:  Pace activity  HEP    OP EDUCATION:  HEP    Assessment:  Patient's symptoms much improved after injection allowing for more aggressive RC strengthening to address instability/crepitus.    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

## 2024-05-08 ENCOUNTER — TREATMENT (OUTPATIENT)
Dept: PHYSICAL THERAPY | Facility: CLINIC | Age: 34
End: 2024-05-08
Payer: COMMERCIAL

## 2024-05-08 DIAGNOSIS — M67.911 TENDINOPATHY OF RIGHT ROTATOR CUFF: Primary | ICD-10-CM

## 2024-05-08 PROCEDURE — 97110 THERAPEUTIC EXERCISES: CPT | Mod: GP

## 2024-05-08 NOTE — PROGRESS NOTES
"    Physical Therapy Treatment    Patient Name: Lucas Clayton  MRN: [id number]  Encounter date:  5/8/2024  Time Calculation  Start Time: 1735  Stop Time: 1815  Time Calculation (min): 40 min          Visit Number:  3 (including evaluation)  Planned total visits: 7  Visits Authorized/Insurance Coverage:  1500 DED- NOT MET , 80/20 COVERAGE, 30V PT/OT/ ST/CARDIO, AUXIANT NO AUTH REF# [id number]    Current Problem  Problem List Items Addressed This Visit             ICD-10-CM    Tendinopathy of right rotator cuff - Primary M67.911   Precautions  none    Pain  0/10    Subjective  General  Patient reports some increase in clunking right shoulder.  Patient with some reports of left UT pain today.  Patient compliant with HEP.  Patient with some increase in work duties- increased lifting.    Objective  Some crepitus right shoulder with over head movements    Treatment:       Therapeutic Exercise  UBE L3 x 4' F/B  Standing1# wand flexion x 20  Door way stretch 20\" x 3  bilateral ER purple TB 2 x 10  standing bilateral HABD purple TB    [employer] seated rows 75# 2 x 10     10# cross over symetry ADD 2 x 10  10# crossover symetry EXT 2 x 10  10# crossover symetry IR 2 x 10     Current HEP:  HEP to be completed daily, exercises include:  Door way stretch for chest/shoulders  GTB standing bilateral ER  GTB standing bilateral HABD     Purple TB ADD  Purple TB EXT  Purple TB IR     Activity tolerance:  good    OP EDUCATION:  HEP    Assessment:  Patient with improved pain and function right shoulder; some crepitus noted posterior shoulder.  Pt's response to treatment:  good  Areas of improvements:  pain  Limitations/deficits:  crepitus right shoulder    Pain end of session:   0/10 right shoulder    Plan:     Continue with current POC with the following changes advance as able. 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

## 2024-05-15 ENCOUNTER — APPOINTMENT (OUTPATIENT)
Dept: PHYSICAL THERAPY | Facility: CLINIC | Age: 34
End: 2024-05-15
Payer: COMMERCIAL

## 2024-05-23 ENCOUNTER — OFFICE VISIT (OUTPATIENT)
Dept: PAIN MEDICINE | Facility: CLINIC | Age: 34
End: 2024-05-23
Payer: COMMERCIAL

## 2024-05-23 VITALS
HEART RATE: 96 BPM | BODY MASS INDEX: 40.09 KG/M2 | SYSTOLIC BLOOD PRESSURE: 132 MMHG | WEIGHT: 296 LBS | DIASTOLIC BLOOD PRESSURE: 68 MMHG | RESPIRATION RATE: 16 BRPM | OXYGEN SATURATION: 98 % | HEIGHT: 72 IN

## 2024-05-23 DIAGNOSIS — G89.29 CHRONIC RIGHT SHOULDER PAIN: Primary | ICD-10-CM

## 2024-05-23 DIAGNOSIS — R20.0 NUMBNESS AND TINGLING IN BOTH HANDS: ICD-10-CM

## 2024-05-23 DIAGNOSIS — R20.2 NUMBNESS AND TINGLING IN BOTH HANDS: ICD-10-CM

## 2024-05-23 DIAGNOSIS — M54.12 CERVICAL RADICULOPATHY: ICD-10-CM

## 2024-05-23 DIAGNOSIS — M25.511 CHRONIC RIGHT SHOULDER PAIN: Primary | ICD-10-CM

## 2024-05-23 PROCEDURE — 99214 OFFICE O/P EST MOD 30 MIN: CPT | Performed by: STUDENT IN AN ORGANIZED HEALTH CARE EDUCATION/TRAINING PROGRAM

## 2024-05-23 PROCEDURE — 1036F TOBACCO NON-USER: CPT | Performed by: STUDENT IN AN ORGANIZED HEALTH CARE EDUCATION/TRAINING PROGRAM

## 2024-05-23 RX ORDER — DEXTROAMPHETAMINE SACCHARATE, AMPHETAMINE ASPARTATE MONOHYDRATE, DEXTROAMPHETAMINE SULFATE AND AMPHETAMINE SULFATE 5; 5; 5; 5 MG/1; MG/1; MG/1; MG/1
20 CAPSULE, EXTENDED RELEASE ORAL EVERY MORNING
COMMUNITY

## 2024-05-23 ASSESSMENT — PAIN DESCRIPTION - DESCRIPTORS: DESCRIPTORS: ACHING

## 2024-05-23 ASSESSMENT — PAIN - FUNCTIONAL ASSESSMENT: PAIN_FUNCTIONAL_ASSESSMENT: 0-10

## 2024-05-23 ASSESSMENT — PAIN SCALES - GENERAL
PAINLEVEL_OUTOF10: 6
PAINLEVEL: 6

## 2024-05-23 ASSESSMENT — PATIENT HEALTH QUESTIONNAIRE - PHQ9
SUM OF ALL RESPONSES TO PHQ9 QUESTIONS 1 AND 2: 0
2. FEELING DOWN, DEPRESSED OR HOPELESS: NOT AT ALL
1. LITTLE INTEREST OR PLEASURE IN DOING THINGS: NOT AT ALL

## 2024-05-23 NOTE — PROGRESS NOTES
[facility] Pain Management  Follow Up Office Visit Note 2024    Patient Information: Lucas Clayton, MRN: [id number], : 1990   Primary Care/Referring Physician: Jake Hoffman, APRN-CNP, [address] [facility] [address] / [place] [address]     Chief Complaint: Right neck and shoulder pain    Interval History: Mr. Clayton presents today for follow up. At hist last visit I performed a right shoulder joint injection    Today he reports 95% pain relief from this injection and this pain is still fairly manageable. His right shoulder still pops/clicks occasionally. His work continues to aggravate his shoulder and neck. He continues to do PT    More recently he is noticing pain overlying his left shoulder. He's had pain radiating further down the arm to his elbow in the past, but hasn't noticed this recently. This pain is worse in the morning and his left shoulder is somewhat painful to touch. He has also been noticing more tingling in his hands, especially when reclining backwards at the computer. He feels this primarily in fingers 3-5 in both hands. He cannot remember if this numbness is on the palmar or dorsal surface of his hands.    Brief History of Pain: Mr. Lucas Clayton is a 33 y.o. male with a PMHx of GERD, asthma, anxiety, ADD who presents for neck and right shoulder/upper arm pain    He reports onset of pain around 2023. He woke up and just noticed worsening pain in his right neck and shoulder. He works as a  but did not obviously injure himself while working. States he had a cervical spine xray at that time which was unremarkable. He tried PO Prednisone multiple times, with improvement while using it, but states in the past 3 weeks it has worsened again significantly. He describes a sharp pain in his right neck which radiates down to his right shoulder, and occasionally down his right lateral arm to the level of the elbow. He also reports some stiffness in his hands. He denies any tingling or  numbness in his right arm. His pain worsens when he coughs and when he tries to raise his arms up (such as when driving)    Current Pain Medications: Meloxicam - takes this 2-3 times a week for his hand pain, Ibuprofen 600 mg at most once daily, Tizanidine 4-8 mg BID PRN - take 4 mg BID  Previously Tried Pain Medications: Gabapentin - minimal pain relief    Relevant Surgeries: Denies  Injections: Right subacromial bursa injection - minimal improvement, Right shoulder joint injection - 95% improvement  Physical/Occupational Therapy: Has started PT and is doing exercises at home    Medications:   Current Outpatient Medications   Medication Instructions    albuterol 90 mcg/actuation inhaler 2 puffs, inhalation, Every 6 hours PRN    amphetamine-dextroamphetamine XR (Adderall XR) 20 mg 24 hr capsule 20 mg, oral, Every morning, Do not crush or chew.    busPIRone (BUSPAR) 10 mg, oral, 2 times daily    clindamycin (CLEOCIN) 300 mg, oral, 3 times daily    FLUoxetine (PROZAC) 20 mg, oral, Daily    fluticasone propion-salmeteroL (Wixela Inhub) 500-50 mcg/dose diskus inhaler 1 puff, inhalation, Every 12 hours    LORazepam (Ativan) 1 mg tablet take one tablet by mouth daily as needed for anxiety (#24=30 day supply per prescriber)    methylPREDNISolone (Medrol Dospak) 4 mg tablets Every 24 hours    montelukast (SINGULAIR) 10 mg, oral, Nightly    multivit with min-folic acid 0.4 mg tablet Every 24 hours    omeprazole (PriLOSEC) 40 mg DR capsule TAKE 1 CAPSULE BY MOUTH 30 MINUTES PRIOR TO MORNING MEAL    predniSONE (Deltasone) 10 mg tablet 40 mg a day for 4 days 30 mg a day for 4 days 20 mg a day for 4 days 10 mg q.day for 4 days 10 mg every other day for 4 doses    tiZANidine (ZANAFLEX) 8 mg, oral, 2 times daily PRN      Allergies:   Allergies   Allergen Reactions    Penicillins Anaphylaxis    Doxycycline Rash       Past Medical & Surgical History:  Past Medical History:   Diagnosis Date    Anxiety     Depression     Neck pain      History reviewed. No pertinent surgical history.    Family History   Problem Relation Name Age of Onset    Breast cancer Mother      Asthma Father       Social History     Socioeconomic History    Marital status: Single     Spouse name: Not on file    Number of children: Not on file    Years of education: Not on file    Highest education level: Not on file   Occupational History    Not on file   Tobacco Use    Smoking status: Never    Smokeless tobacco: Never   Substance and Sexual Activity    Alcohol use: Never    Drug use: Never    Sexual activity: Not on file   Other Topics Concern    Not on file   Social History Narrative    Not on file     Social Determinants of Health     Financial Resource Strain: Not on file   Food Insecurity: Not on file   Transportation Needs: Not on file   Physical Activity: Not on file   Stress: Not on file   Social Connections: Not on file   Intimate Partner Violence: Not on file   Housing Stability: Not on file       Problems, Past medical history, past surgical history, Medications, allergies, social and family history reviewed and as per the electronic medical record from today's encounter    Review of Systems:  CONST: No fever, chills, fatigue, weight changes  EYES: No loss of vision  ENT: No hearing loss, tinnitus  CV: No chest pain, palpitations  RESP: No dyspnea, shortness of breath, cough  GI: No stool incontinence, nausea, vomiting  : No urinary incontinence  MSK: No joint swelling  SKIN: No rash, no hives  NEURO: No headache, dizziness  PSYCH: No anxiety, depression  HEM/LYMPH: No easy bruising or bleeding  All other systems reviewed are negative     Physical Exam:  Vitals: /68   Pulse 96   Resp 16   Ht 1.829 m (6')   Wt 134 kg (296 lb)   SpO2 98%   BMI 40.14 kg/m²   General: No apparent distress. Alert, appropriate, oriented x 3. Mood and affect normal. Speaking in full sentences.  HENT: Normocephalic, atraumatic. Hearing intact.  Eyes: Extraocular movements  "grossly intact. Pupils equal and round.   Neck: Supple, trachea midline.  Lungs: Symmetric respiratory excursion on visual exam, nonlabored breathing.  Extremities: No clubbing, cyanosis, or edema noted in arms or legs.  Skin: No rashes, lesions, alopecia noted on back or extremities.   Neck: Spurling's negative bilaterally. No pain with flexion or extension.   MSK: No significant pain with resisted external shoulder rotation bilaterally. No pain with left shoulder empty can test. Elbow flexion test reproduces tingling in fingers 4 and 5 in bilateral hands.   Neuro: Alert and appropriate. Strength 5/5 throughout bilateral upper extremities. Sensation to light touch intact in bilateral upper extremities. Salas's negative bilaterally. Gait within normal limits. Bulk and tone within normal limits.    Laboratory Data:  The following laboratory data were reviewed during this visit:   Lab Results   Component Value Date    WBC 10.3 09/03/2021    RBC 5.30 09/03/2021    HGB 16.4 09/03/2021    HCT 48.4 09/03/2021     09/03/2021      No results found for: \"INR\"  Lab Results   Component Value Date    CREATININE 0.9 09/03/2021       Imaging:  The following imaging impressions were reviewed by me during this visit:    -2/24/24 right shoulder xray overall unremarkable  -10/3/23 cervical spine xray overall unremarkable     I also personally reviewed the images from the above studies myself. These images and my interpretation of them contributed to the management and decision making of the patient's medical plan.    ASSESSMENT:  Mr. Lucas Clayton is a 33 y.o. male with right neck and shoulder/upper arm pain that is consistent with:    1. Chronic right shoulder pain    2. Cervical radiculopathy    3. Numbness and tingling in both hands          PLAN:    Diagnostics:   - I reviewed his cervical spine xray and right shoulder xray which was unremarkable. Would consider a right shoulder MRI in the future if his pain does not " improve with additional treatment, to evaluate for labral tear  - The tingling in his hands is most consistent with bilateral cubital tunnel syndrome, but there is some possibility of cervical radiculopathy. I recommend an EMG to help delineate the cause of these symptoms    Physical Therapy and Rehabilitation:     - Continue PT for the neck and right shoulder    Psychologically:  - No needs at this time    Medications  - No changes to medication today. He has tried multiple NSAID's including Meloxicam, Diclofenac, and PO steroids without durable pain relief. I did provide him one additional prescription for Norco but will not provide any additional refills of this medication.     Duration  - Greater than 6 months    Interventions:  - I suspect his pain is secondary to right shoulder labral with some possibility of cervical radiculitis. His pain remains fairly severe and is affecting his sleep and ability to work. He has not improved in 6 months with conservative care including activity modification and NSAID's. He underwent a right subacromial bursa injection with minimal improvement in his pain, thus underwent a right shoulder joint injection with 95% improvement in his pain.  - Given my concern for right shoulder labral tear and possible bilateral cubital tunnel syndrome I would consider referral to orthopedic surgery in the future pending additional diagnostics          Sincerely,  Ayo Hawthorne MD  [facility] - [place]

## 2024-05-30 ENCOUNTER — APPOINTMENT (OUTPATIENT)
Dept: PHYSICAL THERAPY | Facility: CLINIC | Age: 34
End: 2024-05-30
Payer: COMMERCIAL

## 2024-05-30 ENCOUNTER — TELEPHONE (OUTPATIENT)
Dept: PAIN MEDICINE | Facility: CLINIC | Age: 34
End: 2024-05-30
Payer: COMMERCIAL

## 2024-06-04 ENCOUNTER — OFFICE VISIT (OUTPATIENT)
Dept: PAIN MEDICINE | Facility: CLINIC | Age: 34
End: 2024-06-04
Payer: COMMERCIAL

## 2024-06-04 VITALS
BODY MASS INDEX: 40.09 KG/M2 | HEART RATE: 102 BPM | RESPIRATION RATE: 16 BRPM | SYSTOLIC BLOOD PRESSURE: 118 MMHG | HEIGHT: 72 IN | DIASTOLIC BLOOD PRESSURE: 70 MMHG | WEIGHT: 296 LBS | OXYGEN SATURATION: 98 %

## 2024-06-04 DIAGNOSIS — R20.2 NUMBNESS AND TINGLING IN BOTH HANDS: ICD-10-CM

## 2024-06-04 DIAGNOSIS — R20.0 NUMBNESS AND TINGLING IN BOTH HANDS: ICD-10-CM

## 2024-06-04 DIAGNOSIS — M25.511 CHRONIC RIGHT SHOULDER PAIN: Primary | ICD-10-CM

## 2024-06-04 DIAGNOSIS — M54.12 CERVICAL RADICULOPATHY: ICD-10-CM

## 2024-06-04 DIAGNOSIS — G89.29 CHRONIC RIGHT SHOULDER PAIN: Primary | ICD-10-CM

## 2024-06-04 PROCEDURE — 99214 OFFICE O/P EST MOD 30 MIN: CPT | Performed by: STUDENT IN AN ORGANIZED HEALTH CARE EDUCATION/TRAINING PROGRAM

## 2024-06-04 PROCEDURE — 1036F TOBACCO NON-USER: CPT | Performed by: STUDENT IN AN ORGANIZED HEALTH CARE EDUCATION/TRAINING PROGRAM

## 2024-06-04 ASSESSMENT — PAIN DESCRIPTION - DESCRIPTORS: DESCRIPTORS: ACHING

## 2024-06-04 ASSESSMENT — PAIN SCALES - GENERAL
PAINLEVEL: 4
PAINLEVEL_OUTOF10: 4

## 2024-06-04 ASSESSMENT — PAIN - FUNCTIONAL ASSESSMENT: PAIN_FUNCTIONAL_ASSESSMENT: 0-10

## 2024-06-04 NOTE — PROGRESS NOTES
[facility]  Follow Up Office Visit Note 2024    Patient Information: Lucas Clayton, MRN: [id number], : 1990   Primary Care/Referring Physician: Jake Hoffman, APRN-CNP, [address] [facility] [address] / [place] [address]     Chief Complaint: Right neck and shoulder pain    Interval History: Mr. Clayton presents today for follow up. At hist last visit I ordered an EMG    Today he reports no major changes since last seen. He continues to struggle at work secondary to his pain. His right shoulder still pops/clicks occasionally. He continues to do PT. He has not scheduled his EMG yet but is planning to    He has also been noticing more tingling in his hands, especially when reclining backwards at the computer. He feels this primarily in fingers 3-5 in both hands. He cannot remember if this numbness is on the palmar or dorsal surface of his hands.    Brief History of Pain: Mr. Lucas Clayton is a 33 y.o. male with a PMHx of GERD, asthma, anxiety, ADD who presents for neck and right shoulder/upper arm pain    He reports onset of pain around 2023. He woke up and just noticed worsening pain in his right neck and shoulder. He works as a  but did not obviously injure himself while working. States he had a cervical spine xray at that time which was unremarkable. He tried PO Prednisone multiple times, with improvement while using it, but states in the past 3 weeks it has worsened again significantly. He describes a sharp pain in his right neck which radiates down to his right shoulder, and occasionally down his right lateral arm to the level of the elbow. He also reports some stiffness in his hands. He denies any tingling or numbness in his right arm. His pain worsens when he coughs and when he tries to raise his arms up (such as when driving)    Current Pain Medications: Meloxicam - takes this 2-3 times a week for his hand pain, Ibuprofen 600 mg at most once daily, Tizanidine 4-8 mg BID PRN - take  4 mg BID  Previously Tried Pain Medications: Gabapentin - minimal pain relief    Relevant Surgeries: Denies  Injections: Right subacromial bursa injection - minimal improvement, Right shoulder joint injection - 95% improvement  Physical/Occupational Therapy: Has started PT and is doing exercises at home    Medications:   Current Outpatient Medications   Medication Instructions    albuterol 90 mcg/actuation inhaler 2 puffs, inhalation, Every 6 hours PRN    amphetamine-dextroamphetamine XR (Adderall XR) 20 mg 24 hr capsule 20 mg, oral, Every morning, Do not crush or chew.    busPIRone (BUSPAR) 10 mg, oral, 2 times daily    clindamycin (CLEOCIN) 300 mg, oral, 3 times daily    FLUoxetine (PROZAC) 20 mg, oral, Daily    fluticasone propion-salmeteroL (Wixela Inhub) 500-50 mcg/dose diskus inhaler 1 puff, inhalation, Every 12 hours    LORazepam (Ativan) 1 mg tablet take one tablet by mouth daily as needed for anxiety (#24=30 day supply per prescriber)    montelukast (SINGULAIR) 10 mg, oral, Nightly    multivit with min-folic acid 0.4 mg tablet Every 24 hours    omeprazole (PriLOSEC) 40 mg DR capsule TAKE 1 CAPSULE BY MOUTH 30 MINUTES PRIOR TO MORNING MEAL    tiZANidine (ZANAFLEX) 8 mg, oral, 2 times daily PRN      Allergies:   Allergies   Allergen Reactions    Penicillins Anaphylaxis    Doxycycline Rash       Past Medical & Surgical History:  Past Medical History:   Diagnosis Date    Anxiety     Depression     Neck pain     History reviewed. No pertinent surgical history.    Family History   Problem Relation Name Age of Onset    Breast cancer Mother      Asthma Father       Social History     Socioeconomic History    Marital status: Single     Spouse name: Not on file    Number of children: Not on file    Years of education: Not on file    Highest education level: Not on file   Occupational History    Not on file   Tobacco Use    Smoking status: Never    Smokeless tobacco: Never   Substance and Sexual Activity    Alcohol  use: Never    Drug use: Never    Sexual activity: Not on file   Other Topics Concern    Not on file   Social History Narrative    Not on file     Social Determinants of Health     Financial Resource Strain: Not on file   Food Insecurity: Not on file   Transportation Needs: Not on file   Physical Activity: Not on file   Stress: Not on file   Social Connections: Not on file   Intimate Partner Violence: Not on file   Housing Stability: Not on file       Problems, Past medical history, past surgical history, Medications, allergies, social and family history reviewed and as per the electronic medical record from today's encounter    Review of Systems:  CONST: No fever, chills, fatigue, weight changes  EYES: No loss of vision  ENT: No hearing loss, tinnitus  CV: No chest pain, palpitations  RESP: No dyspnea, shortness of breath, cough  GI: No stool incontinence, nausea, vomiting  : No urinary incontinence  MSK: No joint swelling  SKIN: No rash, no hives  NEURO: No headache, dizziness  PSYCH: No anxiety, depression  HEM/LYMPH: No easy bruising or bleeding  All other systems reviewed are negative     Physical Exam:  Vitals: /70   Pulse 102   Resp 16   Ht 1.829 m (6')   Wt 134 kg (296 lb)   SpO2 98%   BMI 40.14 kg/m²   General: No apparent distress. Alert, appropriate, oriented x 3. Mood generally positive, affect congruent. Speaking in full sentences.   HENT: Normocephalic, atraumatic. Hearing intact.  Eyes: Pupils equal and round  Neck: Supple, trachea midline  Lungs: Symmetric respiratory excursion on visual exam, nonlabored breathing.   Extremities: No cyanosis or edema noted in extremities.  Skin: No rashes, lesions noted.  Neuro: Alert and appropriate. Gait within normal limits. Bulk and tone within normal limits.    Laboratory Data:  The following laboratory data were reviewed during this visit:   Lab Results   Component Value Date    WBC 10.3 09/03/2021    RBC 5.30 09/03/2021    HGB 16.4 09/03/2021     "HCT 48.4 09/03/2021     09/03/2021      No results found for: \"INR\"  Lab Results   Component Value Date    CREATININE 0.9 09/03/2021       Imaging:  The following imaging impressions were reviewed by me during this visit:    -2/24/24 right shoulder xray overall unremarkable  -10/3/23 cervical spine xray overall unremarkable     I also personally reviewed the images from the above studies myself. These images and my interpretation of them contributed to the management and decision making of the patient's medical plan.    ASSESSMENT:  Mr. Lucas Clayton is a 33 y.o. male with right neck and shoulder/upper arm pain that is consistent with:    1. Chronic right shoulder pain    2. Cervical radiculopathy    3. Numbness and tingling in both hands          PLAN:    Diagnostics:   - I reviewed his cervical spine xray and right shoulder xray which was unremarkable. Would consider a right shoulder MRI in the future if his pain does not improve with additional treatment, to evaluate for labral tear  - The tingling in his hands is most consistent with bilateral cubital tunnel syndrome, but there is some possibility of cervical radiculopathy. 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 Paperwork for this was filled out today    Psychologically:  - No needs at this time    Medications  - No changes to medication today. He has tried multiple NSAID's including Meloxicam, Diclofenac, and PO steroids without durable pain relief. I did provide him one additional prescription for Norco but will not provide any additional refills of this medication.     Duration  - Greater than 6 months    Interventions:  - I suspect " his pain is secondary to right shoulder labral with some possibility of cervical radiculitis. His pain remains fairly severe and is affecting his sleep and ability to work. He has not improved in 6 months with conservative care including activity modification and NSAID's. He underwent a right subacromial bursa injection with minimal improvement in his pain, thus underwent a right shoulder joint injection with 95% improvement in his pain.  - Given my concern for right shoulder labral tear and possible bilateral cubital tunnel syndrome I would consider referral to orthopedic surgery in the future pending additional diagnostics          Sincerely,  Ayo Hawthorne MD  [facility] - [place]

## 2024-06-12 ENCOUNTER — TREATMENT (OUTPATIENT)
Dept: PHYSICAL THERAPY | Facility: CLINIC | Age: 34
End: 2024-06-12
Payer: COMMERCIAL

## 2024-06-12 DIAGNOSIS — M67.911 TENDINOPATHY OF RIGHT ROTATOR CUFF: ICD-10-CM

## 2024-06-12 PROCEDURE — 97110 THERAPEUTIC EXERCISES: CPT | Mod: GP

## 2024-06-12 NOTE — PROGRESS NOTES
Physical Therapy Treatment    Patient Name: Lucas Clayton  MRN: [id number]  Encounter date:  6/12/2024  Time Calculation  Start Time: 1240  Stop Time: 1315  Time Calculation (min): 35 min     PT Therapeutic Procedures Time Entry  Therapeutic Exercise Time Entry: 32    Visit Number:  4 (including evaluation)  Planned total visits: 7  Visits Authorized/Insurance Coverage:  1500 DED- NOT MET , 80/20 COVERAGE, 30V PT/OT/ ST/CARDIO, AUXIANT NO AUTH REF# [id number]    Current Problem  Problem List Items Addressed This Visit             ICD-10-CM    Tendinopathy of right rotator cuff M67.911       Precautions  none       Pain  0/10 right shoulder at present       Subjective  General  Patient to pain management 6/4- discussion in objective.  Patient presently off work x 1 week- potentially off for 3 months.  Patient reports modest improvement in shoulder symptoms; however, continues to report paresthesia digits 3-5 bilateral.  Patient recently completed degree in CAD- may be changing careers.    Objective    PLAN: per Dr.Grenzel lehman up 6/4       Diagnostics:   - I reviewed his cervical spine xray and right shoulder xray which was unremarkable. Would consider a right shoulder MRI in the future if his pain does not improve with additional treatment, to evaluate for labral tear  - The tingling in his hands is most consistent with bilateral cubital tunnel syndrome, but there is some possibility of cervical radiculopathy. 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 Paperwork for this was filled out today     Psychologically:  - No needs at this time     Medications  - No changes to medication today. He has  "tried multiple NSAID's including Meloxicam, Diclofenac, and PO steroids without durable pain relief. I did provide him one additional prescription for Norco but will not provide any additional refills of this medication.      Duration  - Greater than 6 months     Interventions:  - I suspect his pain is secondary to right shoulder labral with some possibility of cervical radiculitis. His pain remains fairly severe and is affecting his sleep and ability to work. He has not improved in 6 months with conservative care including activity modification and NSAID's. He underwent a right subacromial bursa injection with minimal improvement in his pain, thus underwent a right shoulder joint injection with 95% improvement in his pain.  - Given my concern for right shoulder labral tear and possible bilateral cubital tunnel syndrome I would consider referral to orthopedic surgery in the future pending additional diagnostics       Treatment:       Therapeutic Exercise  UBE L3 x 4'  each F/B  Standing1# wand flexion to 90 degrees x 20- crepitus > 90  Door way stretch 20\" x 3  bilateral ER purple TB 2 x 10  standing bilateral HABD purple TB 2 x 10  Wall pushup plus x 20     Technogym seated rows 75# 2 x 10     10# cross over symetry ADD 2 x 10  10# crossover symetry EXT 2 x 10  10# crossover symetry IR 2 x 10     Current HEP:  HEP to be completed daily, exercises include:  Door way stretch for chest/shoulders  GTB standing bilateral ER  GTB standing bilateral HABD     Purple TB ADD  Purple TB EXT  Purple TB IR     Wall push up plus    Activity tolerance:  good    OP EDUCATION:  HEP    Assessment:  Pt's response to treatment:  good  Areas of improvements:  pain  Limitations/deficits:  overhead function    Pain end of session:   No increase in right shoulder symptoms    Plan:     Continue with current POC with the following changes advance as able    Assessment of current progress against goals:  Pt improving but progress is " slow      Goals:  STG(3 visits)   Independent with HEP- MET     LTG(7 visits)   SPADI to < 25 % impaired   MMT right RC elevation to 4+/5   Patient to perform ADLs/work with pain <3/10   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

## 2024-06-18 ENCOUNTER — TREATMENT (OUTPATIENT)
Dept: PHYSICAL THERAPY | Facility: CLINIC | Age: 34
End: 2024-06-18
Payer: COMMERCIAL

## 2024-06-18 DIAGNOSIS — M67.911 TENDINOPATHY OF RIGHT ROTATOR CUFF: Primary | ICD-10-CM

## 2024-06-18 PROCEDURE — 97110 THERAPEUTIC EXERCISES: CPT | Mod: GP

## 2024-06-18 NOTE — PROGRESS NOTES
"    Physical Therapy Treatment    Patient Name: Lucas Clayton  MRN: [id number]  Encounter date:  6/18/2024  Time Calculation  Start Time: 1105  Stop Time: 1140  Time Calculation (min): 35 min     PT Therapeutic Procedures Time Entry  Therapeutic Exercise Time Entry: 35    Visit Number:  5 (including evaluation)  Planned total visits: 7  Visits Authorized/Insurance Coverage:  1500 DED- NOT MET , 80/20 COVERAGE, 30V PT/OT/ ST/CARDIO, AUXIANT NO AUTH REF# [id number]    Current Problem  Problem List Items Addressed This Visit             ICD-10-CM    Tendinopathy of right rotator cuff - Primary M67.911         Precautions  none       Pain  Right shoulder   0/10 at rest  1/10 dinning shirt- \"popping\"    Subjective  General  Patient in good spirits.  Patient reports much improvement with rest/away from work.  Patient reports some \"popping\" with shirt donning.  Patient for EMG this week to evalaute source of UE paresthesia.    Objective  Crepitus with AROM flexion > 90    Treatment:    Therapeutic Exercise  UBE L3 x 4'  each F/B  Standing1# wand flexion to 90 degrees x 20- crepitus > 90  Door way stretch 20\" x 3  bilateral ER purple TB 2 x 10  standing bilateral HABD purple TB 2 x 10  Wall pushup plus x 20     Technogym seated rows 75# 2 x 10     10# cross over symetry ADD 2 x 10  10# crossover symetry EXT 2 x 10  10# crossover symetry IR 2 x 10     Current HEP:  HEP to be completed daily, exercises include:  Door way stretch for chest/shoulders  GTB standing bilateral ER  GTB standing bilateral HABD     Purple TB ADD  Purple TB EXT  Purple TB IR     Wall push up plus       Activity tolerance:  good    OP EDUCATION:  HEP  Posture awareness    Assessment:     Pt's response to treatment:  good  Areas of improvements:  right shoulder pain  Limitations/deficits:  Crepitus/instability    Pain end of session:   No changes    Plan:     Continue with current POC with the following changes add prone rows    Assessment of current " progress against goals:  Pt improving but progress is slow      Goals:  STG(3 visits)   Independent with HEP- MET     LTG(7 visits)   SPADI to < 25 % impaired   MMT right RC elevation to 4+/5   Patient to perform ADLs/work with pain <3/10   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

## 2024-06-20 ENCOUNTER — APPOINTMENT (OUTPATIENT)
Dept: NEUROLOGY | Facility: CLINIC | Age: 34
End: 2024-06-20
Payer: COMMERCIAL

## 2024-06-20 DIAGNOSIS — R20.2 NUMBNESS AND TINGLING IN BOTH HANDS: ICD-10-CM

## 2024-06-20 DIAGNOSIS — M54.12 CERVICAL RADICULOPATHY: ICD-10-CM

## 2024-06-20 DIAGNOSIS — R20.0 NUMBNESS: ICD-10-CM

## 2024-06-20 DIAGNOSIS — R20.0 NUMBNESS AND TINGLING IN BOTH HANDS: ICD-10-CM

## 2024-06-20 DIAGNOSIS — M79.601 PAIN OF RIGHT UPPER EXTREMITY: ICD-10-CM

## 2024-06-20 DIAGNOSIS — M79.602 PAIN OF LEFT UPPER EXTREMITY: Primary | ICD-10-CM

## 2024-06-20 PROCEDURE — 95886 MUSC TEST DONE W/N TEST COMP: CPT | Performed by: PSYCHIATRY & NEUROLOGY

## 2024-06-20 PROCEDURE — 95912 NRV CNDJ TEST 11-12 STUDIES: CPT | Performed by: PSYCHIATRY & NEUROLOGY

## 2024-06-20 NOTE — PROGRESS NOTES
Nerve conduction studies and needle EMG was performed today on this patient.  Full scanned report is available in the Procedure tab in Epic (Chart Review, Procedure tab, double-click the report to enlarge it).  Note:  Dr. Devries remains available to perform EMG studies until 2024, when he will be retiring.    Impression:  Nerve conduction study and needle examination of bilateral upper extremities and cervical paraspinals were performed, see details in table. The results were abnormal because of the followin. Bilaterally prolonged median sensory latencies, prolonged right median motor latency.  Consistent with mild bilateral median neuropathy at the wrist (carpal tunnel syndrome).  2. Otherwise negative study.  No electrical evidence of ulnar neuropathy or cervical radiculopathy was seen.       Dave Devries MD

## 2024-06-25 ENCOUNTER — TREATMENT (OUTPATIENT)
Dept: PHYSICAL THERAPY | Facility: CLINIC | Age: 34
End: 2024-06-25
Payer: COMMERCIAL

## 2024-06-25 DIAGNOSIS — M67.911 TENDINOPATHY OF RIGHT ROTATOR CUFF: Primary | ICD-10-CM

## 2024-06-25 PROCEDURE — 97110 THERAPEUTIC EXERCISES: CPT | Mod: GP

## 2024-06-25 NOTE — PROGRESS NOTES
"    Physical Therapy Treatment    Patient Name: Lucas Clayton  MRN: [id number]  Encounter date:  2024  Time Calculation  Start Time: 1100  Stop Time: 1137  Time Calculation (min): 37 min     PT Therapeutic Procedures Time Entry  Therapeutic Exercise Time Entry: 35    Visit Number:  6 (including evaluation)  Planned total visits: 7  Visits Authorized/Insurance Coverage:  1500 DED- NOT MET , 80/20 COVERAGE, 30V PT/OT/ ST/CARDIO, AUXIANT NO AUTH REF# [id number]    Current Problem  Problem List Items Addressed This Visit             ICD-10-CM    Tendinopathy of right rotator cuff - Primary M67.911    Relevant Orders    Follow Up In Physical Therapy         Precautions  none          Pain  Up to 2/10   Reports \"popping sensation\" when reaching overhead    Subjective  General  Patient reports \"ache this morning\".  Patient diligent with HEP without issues.  Patient to follow with Dr. Hawthorne next 7/3.    Objective  EMG   Impression:  Nerve conduction study and needle examination of bilateral upper extremities and cervical paraspinals were performed, see details in table. The results were abnormal because of the followin. Bilaterally prolonged median sensory latencies, prolonged right median motor latency.  Consistent with mild bilateral median neuropathy at the wrist (carpal tunnel syndrome).  2. Otherwise negative study.  No electrical evidence of ulnar neuropathy or cervical radiculopathy was seen.     FHP; cues with correction    Treatment:    Therapeutic Exercise  UBE L3.5 x 4'  each F/B  Standing1# wand flexion to 90 degrees x 20- crepitus > 90  Door way stretch 20\" x 3  bilateral ER purple TB 2 x 10  standing bilateral HABD purple TB 2 x 10  Wall pushup plus x 20  Prone rows 8# 2 x 10  Scaption  2# 2 x 10  Technogym seated rows 75# 2 x 10     10# cross over symetry ADD 2 x 10  10# crossover symetry EXT 2 x 10  10# crossover symetry IR 2 x 10     Current HEP:  HEP to be completed daily, exercises " include:  Door way stretch for chest/shoulders  GTB standing bilateral ER  GTB standing bilateral HABD     Purple TB ADD  Purple TB EXT  Purple TB IR     Wall push up plus        Activity tolerance:  good    OP EDUCATION:  HEP       Assessment:     Pt's response to treatment:  good  Areas of improvements:  exercise tolerance  Limitations/deficits:  pain/instability    Pain end of session:   No changes  Up to 2/10    Plan:     Continue with current POC with the following changes advance as able    Assessment of current progress against goals:  Progressing toward functional goals and Pt improving but progress is slow  Patient continues to have pain and crepitus/instability with overhead movements right UE.  Patient may benefit form further imaging to evaluate RC/labrum.    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

## 2024-06-28 PROBLEM — Z71.3 DIETARY COUNSELING: Status: ACTIVE | Noted: 2024-06-28

## 2024-06-28 PROBLEM — R63.2 POLYPHAGIA: Status: ACTIVE | Noted: 2024-06-28

## 2024-06-28 PROBLEM — Z72.4 INAPPROPRIATE DIET AND EATING HABITS: Status: ACTIVE | Noted: 2024-06-28

## 2024-06-28 PROBLEM — Z13.21 SCREENING FOR ENDOCRINE, NUTRITIONAL, METABOLIC AND IMMUNITY DISORDER: Status: ACTIVE | Noted: 2024-06-28

## 2024-06-28 PROBLEM — E66.813 CLASS 3 SEVERE OBESITY DUE TO EXCESS CALORIES WITH SERIOUS COMORBIDITY AND BODY MASS INDEX (BMI) OF 40.0 TO 44.9 IN ADULT: Status: ACTIVE | Noted: 2024-06-28

## 2024-06-28 PROBLEM — Z71.82 EXERCISE COUNSELING: Status: ACTIVE | Noted: 2024-06-28

## 2024-06-28 PROBLEM — Z13.0 SCREENING FOR ENDOCRINE, NUTRITIONAL, METABOLIC AND IMMUNITY DISORDER: Status: ACTIVE | Noted: 2024-06-28

## 2024-06-28 PROBLEM — Z13.228 SCREENING FOR ENDOCRINE, NUTRITIONAL, METABOLIC AND IMMUNITY DISORDER: Status: ACTIVE | Noted: 2024-06-28

## 2024-06-28 PROBLEM — E66.01 CLASS 3 SEVERE OBESITY DUE TO EXCESS CALORIES WITH SERIOUS COMORBIDITY AND BODY MASS INDEX (BMI) OF 40.0 TO 44.9 IN ADULT (MULTI): Status: ACTIVE | Noted: 2024-06-28

## 2024-06-28 PROBLEM — Z13.29 SCREENING FOR ENDOCRINE, NUTRITIONAL, METABOLIC AND IMMUNITY DISORDER: Status: ACTIVE | Noted: 2024-06-28

## 2024-06-28 NOTE — PATIENT INSTRUCTIONS
"The Following were Discussed at your Visit Today:    # Weight Loss Medications  The state of [place] requires 1-2 months of a \"Good Tamia Effort\" prior to the initiation of anti-obesity medications.  Please implement the below diet and exercise recommendations over the next 1-2 months.   Return for a Weight and Blood Pressure Check in 1-2 months   If Blood Pressure is controlled and some weight has been lost, we can further discuss adding weight loss medications.   Please call your insurance company and ask about coverage for the following medications:   A. Qsymia  B. Contrave  C. Saxenda/Liraglutide  D. Wegovy/Semaglutide    ** You must call your insurance company BEFORE our next visit to find out if you have coverage for any anti-obesity medications. We cannot start any medications next visit without this information **    # Diet Recommendations:   Keep a food journal and log everything you eat and drink. You can use a phone applications like [employer], [employer] it, a notebook, or the provided meal calendar.   Eat between 3391-5870 calories per day; meal plan provided to you this visit.  Consume less than 100 carbohydrates per day. Examples of carbohydrates include: bread, pasta, cakes, cookies, rice, cereal, fruit drinks, juice, soda and fruit.   Consume no more than 1 fruit per day.  Eat 3 meals and 1 snack. Each meal should have 3-4 oz of protein and vegetables. Limit starches.  Eat on a schedule and do not skip meals.  Meal Plan & Grocery List provided to you today.     # Exercise Recommendations:   Aim for 30 minutes per day, 5 days per week to start, with progression over several weeks to more vigorous intensity.   Emphasize increasing duration, rather than intensity initially.   Moderate-intensity physical activity includes:  * Brisk Walking  * Biking (slower than 10 MPH)  * Water Aerobics  * Vigorous housework (washing windows, vacuuming, mopping)  * Mowing the lawn (push mower)  * Gardening  * Ballroom " dancing    # Lab Work:   Labs orders are in the system.  Please go to any  facility to have these labs drawn.  Please be fasting for a minimum of 8 hours before you have labs drawn.  These labs need to be drawn before our next visit to determine what medication options we have.     # Registered Dietitian:  A referral to a Registered Dietitian has been placed.   You may call [phone] to schedule your appointment  Please be sure to see the Dietitian before our next visit.   Follow-up in 1-2 months

## 2024-06-28 NOTE — PROGRESS NOTES
"Subjective   Patient ID: Lucas Clayton is a 33 y.o. male who presents for Weight Management.    HPI 32 YO Male with Chronic Right Shoulder Pain, Tendinopathy of the right rotator cuff and Primary OA of the right shoulder. He presents for a Weight Management Consultation.    #Referring Provider: Dr. Hawthorne    # Weight History:  Initial onset of obesity:  Heavy the whole time  Past Diet Attempts: Keto and work out  Diet with best results/success: none  Have/Have not taken Prescription/OTC medications for weight loss including: none  Goal(s) for weight loss: lose between 50 - 70 lbs  Juice, pop or other high calorie beverages? Body armour hydration drink  Restaurant/Fast Food Frequency: Chipot[employer] once a week    # Anthropometric Measurements:  Highest Adult Weight: 316 lbs  Lowest Adult Weight: 276 lbs  Current Weight: 316 lbs  Current Height: 6'0\"  Waist Circumference: 50\"  Neck Circumference: 20\"    #Diet Recall:  Breakfast:  none  Lunch: Chipotle     Dinner: Taco pasta with ground turkey  Snack(s): none    # Current Exercise Routine: Physical therapy    Review of Systems Unless noted in the HPI all other systems have been reviewed and are negative for complaint.    Objective   Physical Exam  General- No acute distress, well appearing and well nourished. Obese  Eyes Conjunctiva and lids: No erythema, swelling or discharge  Neck - Supple, no cervical lymphadenopathy.   Pulmonary - Respiratory effort: Normal respiration.   Cardiovascular - Normal rate and rhythm.  Examination of extremities for edema and/or varicosities: No peripheral edema  Abdomen: Soft, Non-tender, non-distended, no abdominal masses.   Musculoskeletal - Some crepitus to the right shoulder but ROM intact.  Skin - Skin and subcutaneous tissue: Normal without rashes or lesions.  Neurologic - Reflexes: Normal. Coordination: Normal gait   Psychiatric - Orientation to person, place, and time: Normal. Mood and affect: Normal.    Assessment/Plan   Problem " "List Items Addressed This Visit       Class 3 severe obesity due to excess calories with serious comorbidity and body mass index (BMI) of 40.0 to 44.9 in adult (Multi) - Primary    Relevant Orders    Referral to Nutrition Services    Comprehensive Metabolic Panel    Ferritin    Hemoglobin A1C    Insulin, Fasting    Lipid Panel    Vitamin D 25-Hydroxy,Total (for eval of Vitamin D levels)    Vitamin B12    Inappropriate diet and eating habits    Relevant Orders    Referral to Nutrition Services    Polyphagia    Screening for endocrine, nutritional, metabolic and immunity disorder    Relevant Orders    Comprehensive Metabolic Panel    Ferritin    Hemoglobin A1C    Insulin, Fasting    Lipid Panel    Vitamin D 25-Hydroxy,Total (for eval of Vitamin D levels)    Vitamin B12    Dietary counseling    Relevant Orders    Referral to Nutrition Services    Exercise counseling     TREATMENT PLAN:  32 YO Make with Obesity here for Weight Management.   Current BMI: 42  [place]AR[place] reviewed.   Informed patient the state of [place] requires 1-2 months of a \"good darian effort\" prior to the initiation of WLM.  Provided dietary and exercise recommendations; patient will implement these changes over the next 1-2 months and follow-up for a weight and BP check.   If some weight has been lost and BP is well controlled, will consider adding in antiobesity medications.   Counseled Heavily on Diet and Exercise; meal plan provided.  Will also check nutritional/metabolic labs to evaluate/screen for any abnormalities.   Risks and benefits discussed.   > 50% of time spent counseling on the importance of following recommended dietary modifications including: role of diet, low calorie and carbohydrate restrictions, limiting fast food and avoiding high sugary beverages.   Also discussed, the role of exercise with an ultimate goal of at least 250-300 minutes a week for weight loss and weight maintenance.   Patient verbalizes understanding.   Follow-up in 1-2 " months.

## 2024-07-01 ENCOUNTER — OFFICE VISIT (OUTPATIENT)
Dept: PAIN MEDICINE | Facility: CLINIC | Age: 34
End: 2024-07-01
Payer: COMMERCIAL

## 2024-07-01 VITALS
OXYGEN SATURATION: 98 % | HEART RATE: 83 BPM | HEIGHT: 72 IN | WEIGHT: 315 LBS | BODY MASS INDEX: 42.66 KG/M2 | DIASTOLIC BLOOD PRESSURE: 82 MMHG | SYSTOLIC BLOOD PRESSURE: 139 MMHG

## 2024-07-01 DIAGNOSIS — Z71.82 EXERCISE COUNSELING: ICD-10-CM

## 2024-07-01 DIAGNOSIS — E66.01 CLASS 3 SEVERE OBESITY DUE TO EXCESS CALORIES WITH SERIOUS COMORBIDITY AND BODY MASS INDEX (BMI) OF 40.0 TO 44.9 IN ADULT (MULTI): Primary | ICD-10-CM

## 2024-07-01 DIAGNOSIS — Z72.4 INAPPROPRIATE DIET AND EATING HABITS: ICD-10-CM

## 2024-07-01 DIAGNOSIS — Z13.21 SCREENING FOR ENDOCRINE, NUTRITIONAL, METABOLIC AND IMMUNITY DISORDER: ICD-10-CM

## 2024-07-01 DIAGNOSIS — Z71.3 DIETARY COUNSELING: ICD-10-CM

## 2024-07-01 DIAGNOSIS — Z13.29 SCREENING FOR ENDOCRINE, NUTRITIONAL, METABOLIC AND IMMUNITY DISORDER: ICD-10-CM

## 2024-07-01 DIAGNOSIS — Z13.0 SCREENING FOR ENDOCRINE, NUTRITIONAL, METABOLIC AND IMMUNITY DISORDER: ICD-10-CM

## 2024-07-01 DIAGNOSIS — R63.2 POLYPHAGIA: ICD-10-CM

## 2024-07-01 DIAGNOSIS — Z13.228 SCREENING FOR ENDOCRINE, NUTRITIONAL, METABOLIC AND IMMUNITY DISORDER: ICD-10-CM

## 2024-07-01 PROCEDURE — 99401 PREV MED CNSL INDIV APPRX 15: CPT | Performed by: NURSE PRACTITIONER

## 2024-07-01 PROCEDURE — 1036F TOBACCO NON-USER: CPT | Performed by: NURSE PRACTITIONER

## 2024-07-01 PROCEDURE — 99215 OFFICE O/P EST HI 40 MIN: CPT | Performed by: NURSE PRACTITIONER

## 2024-07-01 PROCEDURE — 3008F BODY MASS INDEX DOCD: CPT | Performed by: NURSE PRACTITIONER

## 2024-07-01 ASSESSMENT — PAIN SCALES - GENERAL: PAINLEVEL: 3

## 2024-07-02 NOTE — PROGRESS NOTES
[facility] Pain Management  Follow Up Office Visit Note 7/3/2024    Patient Information: Lucas Clayton, MRN: [id number], : 1990   Primary Care/Referring Physician: Jake Hoffman, APRN-CNP, [address] / [place] [address]     Chief Complaint: Right neck and shoulder pain    Interval History: Mr. Clayton presents today for follow up. At hist last visit I ordered an EMG    Today he reports no changes since last seen. He continues to participate in PT and do a regular HEP. His EMG shows mild bilateral CTS. His right shoulder still pops/clicks occasionally.     For reference, he has also been noticing more tingling in his hands, especially when reclining backwards at the computer. He feels this primarily in fingers 3-5 in both hands. He cannot remember if this numbness is on the palmar or dorsal surface of his hands.    Brief History of Pain: Mr. Lucas Clayton is a 33 y.o. male with a PMHx of GERD, asthma, anxiety, ADD who presents for neck and right shoulder/upper arm pain    He reports onset of pain around 2023. He woke up and just noticed worsening pain in his right neck and shoulder. He works as a  but did not obviously injure himself while working. States he had a cervical spine xray at that time which was unremarkable. He tried PO Prednisone multiple times, with improvement while using it, but states in the past 3 weeks it has worsened again significantly. He describes a sharp pain in his right neck which radiates down to his right shoulder, and occasionally down his right lateral arm to the level of the elbow. He also reports some stiffness in his hands. He denies any tingling or numbness in his right arm. His pain worsens when he coughs and when he tries to raise his arms up (such as when driving)    Current Pain Medications: Meloxicam - takes this 2-3 times a week for his hand pain, Ibuprofen 600 mg at most once daily, Tizanidine 4-8 mg BID PRN - take 4 mg BID  Previously Tried Pain  Medications: Gabapentin - minimal pain relief    Relevant Surgeries: Denies  Injections: Right subacromial bursa injection - minimal improvement, Right shoulder joint injection - 95% improvement  Physical/Occupational Therapy: Has started PT and is doing exercises at home    Medications:   Current Outpatient Medications   Medication Instructions    albuterol 90 mcg/actuation inhaler 2 puffs, inhalation, Every 6 hours PRN    amphetamine-dextroamphetamine XR (Adderall XR) 20 mg 24 hr capsule 20 mg, oral, Every morning, Do not crush or chew.    busPIRone (BUSPAR) 10 mg, oral, 2 times daily    clindamycin (CLEOCIN) 300 mg, oral, 3 times daily    FLUoxetine (PROZAC) 20 mg, oral, Daily    fluticasone propion-salmeteroL (Wixela Inhub) 500-50 mcg/dose diskus inhaler 1 puff, inhalation, Every 12 hours    LORazepam (Ativan) 1 mg tablet take one tablet by mouth daily as needed for anxiety (#24=30 day supply per prescriber)    montelukast (SINGULAIR) 10 mg, oral, Nightly    multivit with min-folic acid 0.4 mg tablet Every 24 hours    omeprazole (PriLOSEC) 40 mg DR capsule TAKE 1 CAPSULE BY MOUTH 30 MINUTES PRIOR TO MORNING MEAL    tiZANidine (ZANAFLEX) 8 mg, oral, 2 times daily PRN      Allergies:   Allergies   Allergen Reactions    Penicillins Anaphylaxis    Doxycycline Rash       Past Medical & Surgical History:  Past Medical History:   Diagnosis Date    Anxiety     Depression     Neck pain     History reviewed. No pertinent surgical history.    Family History   Problem Relation Name Age of Onset    Breast cancer Mother      Asthma Father       Social History     Socioeconomic History    Marital status: Single     Spouse name: Not on file    Number of children: Not on file    Years of education: Not on file    Highest education level: Not on file   Occupational History    Not on file   Tobacco Use    Smoking status: Never    Smokeless tobacco: Never   Substance and Sexual Activity    Alcohol use: Never    Drug use: Never     Sexual activity: Not on file   Other Topics Concern    Not on file   Social History Narrative    Not on file     Social Determinants of Health     Financial Resource Strain: Not on file   Food Insecurity: Not on file   Transportation Needs: Not on file   Physical Activity: Not on file   Stress: Not on file   Social Connections: Not on file   Intimate Partner Violence: Not on file   Housing Stability: Not on file       Problems, Past medical history, past surgical history, Medications, allergies, social and family history reviewed and as per the electronic medical record from today's encounter    Review of Systems:  CONST: No fever, chills, fatigue, weight changes  EYES: No loss of vision  ENT: No hearing loss, tinnitus  CV: No chest pain, palpitations  RESP: No dyspnea, shortness of breath, cough  GI: No stool incontinence, nausea, vomiting  : No urinary incontinence  MSK: No joint swelling  SKIN: No rash, no hives  NEURO: No headache, dizziness  PSYCH: No anxiety, depression  HEM/LYMPH: No easy bruising or bleeding  All other systems reviewed are negative     Physical Exam:  Vitals: /76   Pulse 87   Resp 18   Ht 1.829 m (6')   Wt 143 kg (316 lb)   SpO2 98%   BMI 42.86 kg/m²   General: No apparent distress. Alert, appropriate, oriented x 3. Mood generally positive, affect congruent. Speaking in full sentences.   HENT: Normocephalic, atraumatic. Hearing intact.  Eyes: Pupils equal and round  Neck: Supple, trachea midline  Lungs: Symmetric respiratory excursion on visual exam, nonlabored breathing.   Extremities: No cyanosis or edema noted in extremities.  Skin: No rashes, lesions noted.  Neuro: Alert and appropriate. Gait within normal limits. Bulk and tone within normal limits.    Laboratory Data:  The following laboratory data were reviewed during this visit:   Lab Results   Component Value Date    WBC 10.3 09/03/2021    RBC 5.30 09/03/2021    HGB 16.4 09/03/2021    HCT 48.4 09/03/2021      "09/03/2021      No results found for: \"INR\"  Lab Results   Component Value Date    CREATININE 0.9 09/03/2021       Imaging:  The following imaging impressions were reviewed by me during this visit:    -6/20/24 Bilateral UE EMG shows mild CTS bilaterally  -2/24/24 right shoulder xray overall unremarkable  -10/3/23 cervical spine xray overall unremarkable     I also personally reviewed the images from the above studies myself. These images and my interpretation of them contributed to the management and decision making of the patient's medical plan.    ASSESSMENT:  Mr. Lucas Clayton is a 33 y.o. male with right neck and shoulder/upper arm pain that is consistent with:    1. Chronic right shoulder pain    2. Bilateral carpal tunnel syndrome        PLAN:    Diagnostics:   - I reviewed his cervical spine xray and right shoulder xray which was unremarkable. Would consider a right shoulder MR arthrogram in the future if his pain does not improve with additional treatment, to evaluate for labral tear  - The tingling in his hands is clinically most consistent with bilateral cubital tunnel syndrome. He underwent an EMG which interestingly showed bilateral carpal tunnel syndrome. No evidence of cervical radiculopathy. No plan for additional diagnostics at this time.     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 Paperwork for this was filled out previously    Psychologically:  - No needs at this time    Medications  - No changes to medication today. He has tried multiple NSAID's including Meloxicam, Diclofenac, and PO steroids without durable pain relief. I did provide him one additional prescription for Norco but will not provide any additional refills of this medication. "     Duration  - Greater than 6 months    Interventions:  - I suspect his pain is secondary to right shoulder labral tear with some possibility of cervical radiculitis. His pain remains fairly severe and is affecting his sleep and ability to work. He has not improved in 6 months with conservative care including activity modification and NSAID's. He underwent a right subacromial bursa injection with minimal improvement in his pain, thus underwent a right shoulder joint injection with 95% improvement in his pain.  - Given my concern for right shoulder labral tear and possible bilateral cubital tunnel syndrome I would consider referral to orthopedic surgery in the future pending additional diagnostics          Sincerely,  Ayo Hawthorne MD  [facility] [place]

## 2024-07-03 ENCOUNTER — OFFICE VISIT (OUTPATIENT)
Dept: PAIN MEDICINE | Facility: CLINIC | Age: 34
End: 2024-07-03
Payer: COMMERCIAL

## 2024-07-03 VITALS
WEIGHT: 315 LBS | DIASTOLIC BLOOD PRESSURE: 76 MMHG | BODY MASS INDEX: 42.66 KG/M2 | SYSTOLIC BLOOD PRESSURE: 122 MMHG | HEIGHT: 72 IN | RESPIRATION RATE: 18 BRPM | OXYGEN SATURATION: 98 % | HEART RATE: 87 BPM

## 2024-07-03 DIAGNOSIS — G89.29 CHRONIC RIGHT SHOULDER PAIN: Primary | ICD-10-CM

## 2024-07-03 DIAGNOSIS — G56.03 BILATERAL CARPAL TUNNEL SYNDROME: ICD-10-CM

## 2024-07-03 DIAGNOSIS — M25.511 CHRONIC RIGHT SHOULDER PAIN: Primary | ICD-10-CM

## 2024-07-03 PROCEDURE — 99213 OFFICE O/P EST LOW 20 MIN: CPT | Performed by: STUDENT IN AN ORGANIZED HEALTH CARE EDUCATION/TRAINING PROGRAM

## 2024-07-03 PROCEDURE — 3008F BODY MASS INDEX DOCD: CPT | Performed by: STUDENT IN AN ORGANIZED HEALTH CARE EDUCATION/TRAINING PROGRAM

## 2024-07-03 PROCEDURE — 1036F TOBACCO NON-USER: CPT | Performed by: STUDENT IN AN ORGANIZED HEALTH CARE EDUCATION/TRAINING PROGRAM

## 2024-07-03 ASSESSMENT — PAIN SCALES - GENERAL
PAINLEVEL_OUTOF10: 5 - MODERATE PAIN
PAINLEVEL: 5

## 2024-07-03 ASSESSMENT — PATIENT HEALTH QUESTIONNAIRE - PHQ9
1. LITTLE INTEREST OR PLEASURE IN DOING THINGS: NOT AT ALL
SUM OF ALL RESPONSES TO PHQ9 QUESTIONS 1 AND 2: 0
2. FEELING DOWN, DEPRESSED OR HOPELESS: NOT AT ALL

## 2024-07-03 ASSESSMENT — PAIN DESCRIPTION - DESCRIPTORS: DESCRIPTORS: ACHING

## 2024-07-03 ASSESSMENT — PAIN - FUNCTIONAL ASSESSMENT: PAIN_FUNCTIONAL_ASSESSMENT: 0-10

## 2024-07-16 DIAGNOSIS — M25.511 CHRONIC RIGHT SHOULDER PAIN: Primary | ICD-10-CM

## 2024-07-16 DIAGNOSIS — G89.29 CHRONIC RIGHT SHOULDER PAIN: Primary | ICD-10-CM

## 2024-07-16 RX ORDER — HYDROCODONE BITARTRATE AND ACETAMINOPHEN 5; 325 MG/1; MG/1
1 TABLET ORAL EVERY 8 HOURS PRN
Qty: 21 TABLET | Refills: 0 | Status: SHIPPED | OUTPATIENT
Start: 2024-07-16 | End: 2024-07-23

## 2024-07-16 RX ORDER — HYDROCODONE BITARTRATE AND ACETAMINOPHEN 5; 325 MG/1; MG/1
1 TABLET ORAL
COMMUNITY
End: 2024-07-16 | Stop reason: SDUPTHER

## 2024-07-16 NOTE — TELEPHONE ENCOUNTER
Pt. Called and stated that he is having pain in his shoulder. Requesting a prescription of Norco 5/325mg. Next Appointment 7/31/24

## 2024-07-23 NOTE — PROGRESS NOTES
"    Physical Therapy Treatment    Patient Name: Lucas Clayton  MRN: [id number]  Encounter date:  7/24/2024  Time Calculation  Start Time: 1035  Stop Time: 1115  Time Calculation (min): 40 min     PT Therapeutic Procedures Time Entry  Therapeutic Exercise Time Entry: 38    Visit Number:  7 (including evaluation)  Planned total visits: 7  Visits Authorized/Insurance Coverage:  1500 DED- NOT MET , 80/20 COVERAGE, 30V PT/OT/ ST/CARDIO, AUXIANT NO AUTH REF# [id number]    Current Problem  Problem List Items Addressed This Visit             ICD-10-CM    Tendinopathy of right rotator cuff - Primary M67.911          Pain  Right shoulder up to 2/10      Subjective  General  Patient to pain management; to continue with PT, HEP/possible arthorogram-ortho consult/considering extending short tern disability.  Patient reports increase in anterior shoulder pain despite being off of work.  Patient to follow up with pain management.  Patient reluctant to challenge shoulder.    Objective  SPADI  20% impairment    MMT  Right shoulder  Flexion 4/5  Abduction 4/5    Treatment:    Therapeutic Exercise  UBE L3.5 x 2'  each F/B  Standing1# wand flexion to 90 degrees x 20- crepitus > 90  Door way stretch 20\" x 3  bilateral ER purple TB 2 x 10  standing bilateral HABD purple TB 2 x 10  Wall pushup plus x 20  Prone rows 8# 2 x 10  Scaption  2# 2 x 10  Technogym seated rows 75# 2 x 10     10# cross over symetry ADD 2 x 10  10# crossover symetry EXT 2 x 10  10# crossover symetry IR 2 x 10     Current HEP:  HEP to be completed daily, exercises include:  Door way stretch for chest/shoulders  GTB standing bilateral ER  GTB standing bilateral HABD     Purple TB ADD  Purple TB EXT  Purple TB IR     Wall push up plus        Activity tolerance:  good    OP EDUCATION:  Discussed importance of HEP compliance    Assessment:  Pt's response to treatment:  good  Areas of improvements:  strength right shoulder  Limitations/deficits:  pain/function    Pain end of " session:   2/10    Plan:  Plan is to continue with HEP to increase RC/scapular stabilizer strengthening. pending ortho consult.  Patient otherwise discharge form PT.       Assessment of current progress against goals:  Progressing toward functional goals and Regressing, increased or new symptoms  Patient making good gains with respect to strength of RC/scapular stabilizers; however, pain and dysfunction continues consistent with labral injury.        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

## 2024-07-24 ENCOUNTER — LAB (OUTPATIENT)
Dept: LAB | Facility: LAB | Age: 34
End: 2024-07-24
Payer: COMMERCIAL

## 2024-07-24 ENCOUNTER — TREATMENT (OUTPATIENT)
Dept: PHYSICAL THERAPY | Facility: CLINIC | Age: 34
End: 2024-07-24
Payer: COMMERCIAL

## 2024-07-24 DIAGNOSIS — Z13.21 SCREENING FOR ENDOCRINE, NUTRITIONAL, METABOLIC AND IMMUNITY DISORDER: ICD-10-CM

## 2024-07-24 DIAGNOSIS — Z13.29 SCREENING FOR ENDOCRINE, NUTRITIONAL, METABOLIC AND IMMUNITY DISORDER: ICD-10-CM

## 2024-07-24 DIAGNOSIS — Z13.228 SCREENING FOR ENDOCRINE, NUTRITIONAL, METABOLIC AND IMMUNITY DISORDER: ICD-10-CM

## 2024-07-24 DIAGNOSIS — E66.01 CLASS 3 SEVERE OBESITY DUE TO EXCESS CALORIES WITH SERIOUS COMORBIDITY AND BODY MASS INDEX (BMI) OF 40.0 TO 44.9 IN ADULT (MULTI): ICD-10-CM

## 2024-07-24 DIAGNOSIS — Z13.0 SCREENING FOR ENDOCRINE, NUTRITIONAL, METABOLIC AND IMMUNITY DISORDER: ICD-10-CM

## 2024-07-24 DIAGNOSIS — M67.911 TENDINOPATHY OF RIGHT ROTATOR CUFF: Primary | ICD-10-CM

## 2024-07-24 LAB
25(OH)D3 SERPL-MCNC: 17 NG/ML (ref 30–100)
ALBUMIN SERPL BCP-MCNC: 4.7 G/DL (ref 3.4–5)
ALP SERPL-CCNC: 61 U/L (ref 33–120)
ALT SERPL W P-5'-P-CCNC: 37 U/L (ref 10–52)
ANION GAP SERPL CALC-SCNC: 12 MMOL/L (ref 10–20)
AST SERPL W P-5'-P-CCNC: 21 U/L (ref 9–39)
BILIRUB SERPL-MCNC: 0.7 MG/DL (ref 0–1.2)
BUN SERPL-MCNC: 13 MG/DL (ref 6–23)
CALCIUM SERPL-MCNC: 9.7 MG/DL (ref 8.6–10.3)
CHLORIDE SERPL-SCNC: 102 MMOL/L (ref 98–107)
CHOLEST SERPL-MCNC: 208 MG/DL (ref 0–199)
CHOLESTEROL/HDL RATIO: 4.9
CO2 SERPL-SCNC: 27 MMOL/L (ref 21–32)
CREAT SERPL-MCNC: 0.9 MG/DL (ref 0.5–1.3)
EGFRCR SERPLBLD CKD-EPI 2021: >90 ML/MIN/1.73M*2
EST. AVERAGE GLUCOSE BLD GHB EST-MCNC: 117 MG/DL
FERRITIN SERPL-MCNC: 518 NG/ML (ref 20–300)
GLUCOSE SERPL-MCNC: 102 MG/DL (ref 74–99)
HBA1C MFR BLD: 5.7 %
HDLC SERPL-MCNC: 42.7 MG/DL
LDLC SERPL CALC-MCNC: 141 MG/DL
NON HDL CHOLESTEROL: 165 MG/DL (ref 0–149)
POTASSIUM SERPL-SCNC: 4.2 MMOL/L (ref 3.5–5.3)
PROT SERPL-MCNC: 7.2 G/DL (ref 6.4–8.2)
SODIUM SERPL-SCNC: 137 MMOL/L (ref 136–145)
TRIGL SERPL-MCNC: 120 MG/DL (ref 0–149)
VIT B12 SERPL-MCNC: 397 PG/ML (ref 211–911)
VLDL: 24 MG/DL (ref 0–40)

## 2024-07-24 PROCEDURE — 80061 LIPID PANEL: CPT

## 2024-07-24 PROCEDURE — 36415 COLL VENOUS BLD VENIPUNCTURE: CPT

## 2024-07-24 PROCEDURE — 97110 THERAPEUTIC EXERCISES: CPT | Mod: GP

## 2024-07-24 PROCEDURE — 83525 ASSAY OF INSULIN: CPT

## 2024-07-24 PROCEDURE — 82607 VITAMIN B-12: CPT

## 2024-07-24 PROCEDURE — 83036 HEMOGLOBIN GLYCOSYLATED A1C: CPT

## 2024-07-24 PROCEDURE — 80053 COMPREHEN METABOLIC PANEL: CPT

## 2024-07-24 PROCEDURE — 82728 ASSAY OF FERRITIN: CPT

## 2024-07-24 PROCEDURE — 82306 VITAMIN D 25 HYDROXY: CPT

## 2024-07-25 PROBLEM — E66.9 ABDOMINAL OBESITY AND METABOLIC SYNDROME: Status: ACTIVE | Noted: 2024-07-25

## 2024-07-25 PROBLEM — E78.2 MIXED HYPERLIPIDEMIA: Status: ACTIVE | Noted: 2024-07-25

## 2024-07-25 PROBLEM — R73.01 ABNORMAL FASTING GLUCOSE: Status: ACTIVE | Noted: 2024-07-25

## 2024-07-25 PROBLEM — E16.1 HYPERINSULINEMIA: Status: ACTIVE | Noted: 2024-07-25

## 2024-07-25 PROBLEM — R79.89 ELEVATED FERRITIN: Status: ACTIVE | Noted: 2024-07-25

## 2024-07-25 PROBLEM — E88.810 ABDOMINAL OBESITY AND METABOLIC SYNDROME: Status: ACTIVE | Noted: 2024-07-25

## 2024-07-25 PROBLEM — E55.9 VITAMIN D DEFICIENCY: Status: ACTIVE | Noted: 2024-07-25

## 2024-07-25 PROBLEM — R73.03 PREDIABETES: Status: ACTIVE | Noted: 2024-07-25

## 2024-07-25 LAB — INSULIN P FAST SERPL-ACNC: 31 UIU/ML (ref 3–25)

## 2024-07-25 NOTE — PROGRESS NOTES
hemSubjective   Patient ID: Lucas Clayton is a 33 y.o. male who presents for No chief complaint on file..    HPI  32 YO Male with Chronic Right Shoulder Pain, Tendinopathy of the right rotator cuff and Primary OA of the right shoulder. He presents for a Weight Management. Lucas was seen last month and given diet and exercise recommendations. He was asked to implement these changes over the next 1-2 months and return, having lost some weight, to further discuss anti-obesity medications and lab results.     Weight Hx:  Initial Weight: 316  Current Weight: 316  Total Loss: 0    Diet Recall:  Breakfast: none  Lunch: Turkey and ham sandwich  Dinner: pot roast  Snack(s): grapes and pineapple    Current Exercise Routine: walking    Review of Systems Unless noted in the HPI all other systems have been reviewed and are negative for complaint.     Objective   Physical Exam  General- No acute distress, well appearing and well nourished. Obese  Eyes Conjunctiva and lids: No erythema, swelling or discharge  Neck - Supple, no cervical lymphadenopathy.   Pulmonary - Respiratory effort: Normal respiration.   Cardiovascular - Normal rate and rhythm.  Examination of extremities for edema and/or varicosities: No peripheral edema  Abdomen: Soft, Non-tender, non-distended, no abdominal masses.   Musculoskeletal - Some crepitus to the right shoulder but ROM intact.  Skin - Skin and subcutaneous tissue: Normal without rashes or lesions.  Neurologic - Reflexes: Normal. Coordination: Normal gait   Psychiatric - Orientation to person, place, and time: Normal. Mood and affect: Normal.    Assessment/Plan       TREATMENT PLAN:  32 YO Make with Obesity here for Weight Management.   Current BMI: 42  Total Weight Loss: 0  Labs personally reviewed with patient.   Vitamin D deficiency--> Vitamin D is 17.  Will send Rx for supplementation x 12 weeks then recheck.  Abnormal lipid panel--> Elevated TC (208), Elevated TC:HDL Ratio (4.9), Elevated LDL  "(141).  Hyperinsulinemia--> Fasting insulin 31  Prediabetes--> A1C 5.7%  Elevated Ferritin--> 518.  Whereas ferritin can be elevated in the setting of metabolic syndrome, 518 is quite elevated.  I will place a referral to hematology for further evaluation.  Elevated fasting glucose--> 102  CLARENCE-IR Score= 7.8 (>1.6 suggests IR/Metabolic Syndrome).   Lucas has not fulfilled the \"good darian effort.\"  Reviewed [place] lower requirements as well as diet and exercise recommendations.  Lucas will implement these changes over the next 1-2 months and follow-up for a weight and BP check.   If some weight has been lost and BP is well controlled, will consider adding in antiobesity medications.   Counseled Heavily on Diet and Exercise; meal plan provided.  Risks and benefits discussed.   > 50% of time spent counseling on the importance of following recommended dietary modifications including: role of diet, low calorie and carbohydrate restrictions, limiting fast food and avoiding high sugary beverages.   Also discussed, the role of exercise with an ultimate goal of at least 250-300 minutes a week for weight loss and weight maintenance.   Patient verbalizes understanding.   Follow-up in 1-2 months.     "

## 2024-07-25 NOTE — PATIENT INSTRUCTIONS
"Vitamin D Deficiency:  Your Vitamin D Level is Low   I have sent a prescription to your local pharmacy  You will take this medication, twice weekly, for 12 weeks and then we will recheck your levels.    Abnormal Lipid Panel:  Your Total Cholesterol Level is elevated.  Your LDL Level is elevated.  Your Total Cholesterol to HDL ratio is elevated.  This is not uncommon with insulin resistance/metabolic syndrome  Diet, exercise and weight reduction will help lower your Lipids but discussion with your PCP is always recommended.    Hyperinsulinemia:  Your fasting insulin level is elevated  This means the amount of insulin in your blood is more than normal  This can contribute to the future development of Diabetes and Cardiovascular Disease    Pre-Diabetes:  Your Hemoglobin A1C is 5.7%   This is in the \"pre-diabetes\" category  Aggressive lifestyle modification focusing on weight reduction and increased physical activity is the primary therapy for the management of Diabetes.   Weight reduction is optimally achieved with a multimodality approach including diet, exercise and possible pharmacologic therapy.    Elevated Ferritin:  Whereas ferritin can be elevated in the setting of metabolic syndrome your level was 518 which is quite high.  I have placed a referral to hematology for further evaluation.    Metabolic Syndrome/Insulin Resistance:   Your CLARENCE-IR Score= 7.8 (>1.6 suggests Insulin Resistance).   Your recent labs show insulin resistance of which a diagnosis of Metabolic Syndrome is supported   Metabolic Syndrome is an important risk factor for the future development of Diabetes and/or Cardiovascular Disease.  Metabolic Syndrome has also been associated with other obesity-related disorders including: Fatty Liver Disease (Steatosis, Fibrosis and Cirrhosis), Hepatocellular Carcinoma, Chronic Kidney Disease, Obstructive Sleep Apnea, Gout, Cognitive Decline and Dementia.   Aggressive lifestyle modification focusing on " "weight reduction and increased physical activity is the primary therapy for the management of Metabolic Syndrome.  Weight reduction is optimally achieved with a multimodality approach including diet, exercise and possible pharmacologic therapy.    Diet Recommendations:  - Keep a food journal and log everything you eat and drink. You can use a phone applications like [employer], [employer] it, a notebook, or the provided meal calendar.   - Eat between 1943-9574 calories per day; meal plan provided to you this visit.  - Consume less than 100 carbohydrates per day. Examples of carbohydrates include: bread, pasta, cakes, cookies, rice, cereal, fruit drinks, juice, soda and fruit.   - Consume no more than 1 fruit per day.   - Eat 3 meals and 1 snack. Each meal should have 3-4 oz of protein and vegetables. Limit starches.  - Eat on a schedule and do not skip meals.    Exercise Recommendations:   - Aim for 30 minutes per day, 5 days per week to start, with progression over several weeks to more vigorous intensity.   - Emphasize increasing duration, rather than intensity initially.   - Moderate-intensity physical activity includes:  * Brisk Walking  * Biking (slower than 10 MPH)  * Water Aerobics  * Vigorous housework (washing windows, vacuuming, mopping)  * Mowing the lawn (push mower)  * Gardening  * Ballroom dancing    # Weight Loss Medications:  The state of [place] requires 1-2 months of a \"Good Tamia Effort\" prior to the initiation of anti-obesity medications.  Please implement the below diet and exercise recommendations over the next 1-2 months.   Return for a Weight and Blood Pressure Check in 1-2 months   If Blood Pressure is controlled and some weight has been lost, we can further discuss adding weight loss medications.   Please call your insurance company and ask about coverage for the following medications:   A. Qsymia  B. Contrave  C. Saxenda/Liraglutide  D. Wegovy/Semaglutide  E. Zepbound/Tirzepatide    ** You must " call your insurance company BEFORE our next visit to find out if you have coverage for any anti-obesity medications. We cannot start any medications next visit without this information **    # Registered Dietitian:  A referral to a Registered Dietitian has been placed.   You may call [phone] to schedule your appointment  Please be sure to see the Dietitian before our next visit.     Follow-up in 1-2 months

## 2024-07-29 ENCOUNTER — OFFICE VISIT (OUTPATIENT)
Dept: PAIN MEDICINE | Facility: CLINIC | Age: 34
End: 2024-07-29
Payer: COMMERCIAL

## 2024-07-29 VITALS
SYSTOLIC BLOOD PRESSURE: 145 MMHG | OXYGEN SATURATION: 100 % | WEIGHT: 315 LBS | BODY MASS INDEX: 42.66 KG/M2 | DIASTOLIC BLOOD PRESSURE: 84 MMHG | HEIGHT: 72 IN | HEART RATE: 71 BPM

## 2024-07-29 DIAGNOSIS — R79.89 ELEVATED FERRITIN: ICD-10-CM

## 2024-07-29 DIAGNOSIS — R73.01 ABNORMAL FASTING GLUCOSE: ICD-10-CM

## 2024-07-29 DIAGNOSIS — R73.03 PREDIABETES: Primary | ICD-10-CM

## 2024-07-29 DIAGNOSIS — Z71.3 DIETARY COUNSELING: ICD-10-CM

## 2024-07-29 DIAGNOSIS — E66.01 CLASS 3 SEVERE OBESITY DUE TO EXCESS CALORIES WITH SERIOUS COMORBIDITY AND BODY MASS INDEX (BMI) OF 40.0 TO 44.9 IN ADULT (MULTI): ICD-10-CM

## 2024-07-29 DIAGNOSIS — E88.810 ABDOMINAL OBESITY AND METABOLIC SYNDROME: ICD-10-CM

## 2024-07-29 DIAGNOSIS — E66.9 ABDOMINAL OBESITY AND METABOLIC SYNDROME: ICD-10-CM

## 2024-07-29 DIAGNOSIS — Z71.82 EXERCISE COUNSELING: ICD-10-CM

## 2024-07-29 DIAGNOSIS — E78.2 MIXED HYPERLIPIDEMIA: ICD-10-CM

## 2024-07-29 DIAGNOSIS — E16.1 HYPERINSULINEMIA: ICD-10-CM

## 2024-07-29 DIAGNOSIS — R63.2 POLYPHAGIA: ICD-10-CM

## 2024-07-29 DIAGNOSIS — E55.9 VITAMIN D DEFICIENCY: ICD-10-CM

## 2024-07-29 PROCEDURE — 1036F TOBACCO NON-USER: CPT | Performed by: NURSE PRACTITIONER

## 2024-07-29 PROCEDURE — 99401 PREV MED CNSL INDIV APPRX 15: CPT | Performed by: NURSE PRACTITIONER

## 2024-07-29 PROCEDURE — 99214 OFFICE O/P EST MOD 30 MIN: CPT | Performed by: NURSE PRACTITIONER

## 2024-07-29 PROCEDURE — 3008F BODY MASS INDEX DOCD: CPT | Performed by: NURSE PRACTITIONER

## 2024-07-29 RX ORDER — ERGOCALCIFEROL 1.25 MG/1
50000 CAPSULE ORAL 2 TIMES WEEKLY
Qty: 24 CAPSULE | Refills: 0 | Status: SHIPPED | OUTPATIENT
Start: 2024-07-29 | End: 2024-10-21

## 2024-07-29 ASSESSMENT — PAIN SCALES - GENERAL: PAINLEVEL: 3

## 2024-07-31 ENCOUNTER — TELEMEDICINE (OUTPATIENT)
Dept: PAIN MEDICINE | Facility: CLINIC | Age: 34
End: 2024-07-31
Payer: COMMERCIAL

## 2024-07-31 DIAGNOSIS — G56.03 BILATERAL CARPAL TUNNEL SYNDROME: ICD-10-CM

## 2024-07-31 DIAGNOSIS — G89.29 CHRONIC RIGHT SHOULDER PAIN: Primary | ICD-10-CM

## 2024-07-31 DIAGNOSIS — M25.511 CHRONIC RIGHT SHOULDER PAIN: Primary | ICD-10-CM

## 2024-07-31 PROCEDURE — 99214 OFFICE O/P EST MOD 30 MIN: CPT | Performed by: STUDENT IN AN ORGANIZED HEALTH CARE EDUCATION/TRAINING PROGRAM

## 2024-07-31 PROCEDURE — 1036F TOBACCO NON-USER: CPT | Performed by: STUDENT IN AN ORGANIZED HEALTH CARE EDUCATION/TRAINING PROGRAM

## 2024-07-31 ASSESSMENT — PAIN SCALES - GENERAL
PAINLEVEL: 3
PAINLEVEL_OUTOF10: 3

## 2024-07-31 ASSESSMENT — PAIN - FUNCTIONAL ASSESSMENT: PAIN_FUNCTIONAL_ASSESSMENT: 0-10

## 2024-07-31 ASSESSMENT — PAIN DESCRIPTION - DESCRIPTORS: DESCRIPTORS: ACHING

## 2024-07-31 NOTE — PROGRESS NOTES
[facility]  Follow Up Office Visit Note 2024    Patient Information: Lucas Clayton, MRN: [id number], : 1990   Primary Care/Referring Physician: Jake Hoffman, APRN-CNP, [address] [facility] [address] / [place] [address]     Chief Complaint: Right neck and shoulder pain    Interval History: Mr. Clayton presents today for follow up. At hist last visit I made no changes, but discussed possibly ordering a right shoulder MR arthrogram    Today he reports no major improvement since last seen. He has finished PT but still feels limited by his shoulder pain. He does not believe he can return to work in his current state.    For reference, he has also been noticing more tingling in his hands, especially when reclining backwards at the computer. He feels this primarily in fingers 3-5 in both hands. He cannot remember if this numbness is on the palmar or dorsal surface of his hands.    Brief History of Pain: Mr. Lucas Clayton is a 33 y.o. male with a PMHx of GERD, asthma, anxiety, ADD who presents for neck and right shoulder/upper arm pain    He reports onset of pain around 2023. He woke up and just noticed worsening pain in his right neck and shoulder. He works as a  but did not obviously injure himself while working. States he had a cervical spine xray at that time which was unremarkable. He tried PO Prednisone multiple times, with improvement while using it, but states in the past 3 weeks it has worsened again significantly. He describes a sharp pain in his right neck which radiates down to his right shoulder, and occasionally down his right lateral arm to the level of the elbow. He also reports some stiffness in his hands. He denies any tingling or numbness in his right arm. His pain worsens when he coughs and when he tries to raise his arms up (such as when driving)    Current Pain Medications: Meloxicam - takes this 2-3 times a week for his hand pain, Ibuprofen 600 mg at most once daily,  Tizanidine 4-8 mg BID PRN - take 4 mg BID  Previously Tried Pain Medications: Gabapentin - minimal pain relief    Relevant Surgeries: Denies  Injections: Right subacromial bursa injection - minimal improvement, Right shoulder joint injection - 95% improvement  Physical/Occupational Therapy: Has started PT and is doing exercises at home    Medications:   Current Outpatient Medications   Medication Instructions    albuterol 90 mcg/actuation inhaler 2 puffs, inhalation, Every 6 hours PRN    amphetamine-dextroamphetamine XR (Adderall XR) 20 mg 24 hr capsule 20 mg, oral, Every morning, Do not crush or chew.    busPIRone (BUSPAR) 10 mg, oral, 2 times daily    clindamycin (CLEOCIN) 300 mg, oral, 3 times daily    ergocalciferol (DRISDOL) 50,000 Units, oral, 2 times weekly    FLUoxetine (PROZAC) 20 mg, oral, Daily    fluticasone propion-salmeteroL (Wixela Inhub) 500-50 mcg/dose diskus inhaler 1 puff, inhalation, Every 12 hours    LORazepam (Ativan) 1 mg tablet take one tablet by mouth daily as needed for anxiety (#24=30 day supply per prescriber)    montelukast (SINGULAIR) 10 mg, oral, Nightly    multivit with min-folic acid 0.4 mg tablet Every 24 hours    omeprazole (PriLOSEC) 40 mg DR capsule TAKE 1 CAPSULE BY MOUTH 30 MINUTES PRIOR TO MORNING MEAL    tiZANidine (ZANAFLEX) 8 mg, oral, 2 times daily PRN      Allergies:   Allergies   Allergen Reactions    Penicillins Anaphylaxis    Doxycycline Rash       Past Medical & Surgical History:  Past Medical History:   Diagnosis Date    Anxiety     Depression     Neck pain     History reviewed. No pertinent surgical history.    Family History   Problem Relation Name Age of Onset    Breast cancer Mother      Asthma Father       Social History     Socioeconomic History    Marital status: Single     Spouse name: Not on file    Number of children: Not on file    Years of education: Not on file    Highest education level: Not on file   Occupational History    Not on file   Tobacco Use     "Smoking status: Never    Smokeless tobacco: Never   Substance and Sexual Activity    Alcohol use: Never    Drug use: Never    Sexual activity: Not on file   Other Topics Concern    Not on file   Social History Narrative    Not on file     Social Determinants of Health     Financial Resource Strain: Not on file   Food Insecurity: Not on file   Transportation Needs: Not on file   Physical Activity: Not on file   Stress: Not on file   Social Connections: Not on file   Intimate Partner Violence: Not on file   Housing Stability: Not on file       Problems, Past medical history, past surgical history, Medications, allergies, social and family history reviewed and as per the electronic medical record from today's encounter    Review of Systems:  CONST: No fever, chills, fatigue, weight changes  EYES: No loss of vision  ENT: No hearing loss, tinnitus  CV: No chest pain, palpitations  RESP: No dyspnea, shortness of breath, cough  GI: No stool incontinence, nausea, vomiting  : No urinary incontinence  MSK: No joint swelling  SKIN: No rash, no hives  NEURO: No headache, dizziness  PSYCH: No anxiety, depression  HEM/LYMPH: No easy bruising or bleeding  All other systems reviewed are negative     Physical Exam:  Vitals: There were no vitals taken for this visit.  Limited by telemedicine  General: No apparent distress. Alert, appropriate, oriented x 3. Mood generally positive, affect congruent. Speaking in full sentences.   HENT: Normocephalic, atraumatic. Hearing intact.  Lungs: Grossly nonlabored breathing.    Laboratory Data:  The following laboratory data were reviewed during this visit:   Lab Results   Component Value Date    WBC 10.3 09/03/2021    RBC 5.30 09/03/2021    HGB 16.4 09/03/2021    HCT 48.4 09/03/2021     09/03/2021      No results found for: \"INR\"  Lab Results   Component Value Date    CREATININE 0.90 07/24/2024    HGBA1C 5.7 (H) 07/24/2024       Imaging:  The following imaging impressions were reviewed " by me during this visit:    -6/20/24 Bilateral UE EMG shows mild CTS bilaterally  -2/24/24 right shoulder xray overall unremarkable  -10/3/23 cervical spine xray overall unremarkable     I also personally reviewed the images from the above studies myself. These images and my interpretation of them contributed to the management and decision making of the patient's medical plan.    ASSESSMENT:  Mr. Lucas Clayton is a 33 y.o. male with right neck and shoulder/upper arm pain that is consistent with:    1. Chronic right shoulder pain    2. Bilateral carpal tunnel syndrome        PLAN:    Diagnostics:   - I reviewed his cervical spine xray and right shoulder xray which was unremarkable. He has completed PT and continues to have pain and clicking/popping in the right shoulder. I recommend a right shoulder MR arthrogram to evaluate for labral tear  - The tingling in his hands is clinically most consistent with bilateral cubital tunnel syndrome. He underwent an EMG which interestingly showed bilateral carpal tunnel syndrome. No evidence of cervical radiculopathy. No plan for additional diagnostics at this time.     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 Paperwork for this was filled out previously    Psychologically:  - No needs at this time    Medications  - No changes to medication today. He has tried multiple NSAID's including Meloxicam, Diclofenac, and PO steroids without durable pain relief. I did provide him one additional prescription for Norco but will not provide any additional refills of this medication.     Duration  - Greater than 6 months    Interventions:  - I suspect his pain is secondary to right shoulder labral tear with some possibility of cervical radiculitis.  His pain remains fairly severe and is affecting his sleep and ability to work. He has not improved in 6 months with conservative care including activity modification and NSAID's. He underwent a right subacromial bursa injection with minimal improvement in his pain, thus underwent a right shoulder joint injection with 95% improvement in his pain.  - Given my concern for right shoulder labral tear and possible bilateral cubital tunnel syndrome I would consider referral to orthopedic surgery in the future pending additional diagnostics          Sincerely,  Ayo Hawthorne MD  [facility] - [place]

## 2024-08-06 DIAGNOSIS — G89.29 CHRONIC RIGHT SHOULDER PAIN: ICD-10-CM

## 2024-08-06 DIAGNOSIS — M25.511 CHRONIC RIGHT SHOULDER PAIN: ICD-10-CM

## 2024-08-06 RX ORDER — TIZANIDINE 4 MG/1
8 TABLET ORAL 2 TIMES DAILY PRN
Qty: 120 TABLET | Refills: 1 | Status: SHIPPED | OUTPATIENT
Start: 2024-08-06 | End: 2024-10-05

## 2024-08-28 ENCOUNTER — OFFICE VISIT (OUTPATIENT)
Dept: HEMATOLOGY/ONCOLOGY | Facility: CLINIC | Age: 34
End: 2024-08-28
Payer: COMMERCIAL

## 2024-08-28 VITALS
BODY MASS INDEX: 42.66 KG/M2 | HEART RATE: 106 BPM | WEIGHT: 315 LBS | SYSTOLIC BLOOD PRESSURE: 137 MMHG | DIASTOLIC BLOOD PRESSURE: 87 MMHG | TEMPERATURE: 97.3 F | RESPIRATION RATE: 17 BRPM | OXYGEN SATURATION: 99 % | HEIGHT: 72 IN

## 2024-08-28 DIAGNOSIS — R79.89 ELEVATED FERRITIN: ICD-10-CM

## 2024-08-28 LAB
ALBUMIN SERPL BCP-MCNC: 4.8 G/DL (ref 3.4–5)
ALP SERPL-CCNC: 63 U/L (ref 33–120)
ALT SERPL W P-5'-P-CCNC: 38 U/L (ref 10–52)
ANION GAP SERPL CALC-SCNC: 13 MMOL/L (ref 10–20)
APTT PPP: 31 SECONDS (ref 27–38)
AST SERPL W P-5'-P-CCNC: 20 U/L (ref 9–39)
BASOPHILS # BLD AUTO: 0.03 X10*3/UL (ref 0–0.1)
BASOPHILS NFR BLD AUTO: 0.4 %
BILIRUB SERPL-MCNC: 0.7 MG/DL (ref 0–1.2)
BUN SERPL-MCNC: 13 MG/DL (ref 6–23)
CALCIUM SERPL-MCNC: 9.5 MG/DL (ref 8.6–10.3)
CHLORIDE SERPL-SCNC: 101 MMOL/L (ref 98–107)
CO2 SERPL-SCNC: 27 MMOL/L (ref 21–32)
CREAT SERPL-MCNC: 0.8 MG/DL (ref 0.5–1.3)
CRP SERPL-MCNC: 0.26 MG/DL
EGFRCR SERPLBLD CKD-EPI 2021: >90 ML/MIN/1.73M*2
EOSINOPHIL # BLD AUTO: 0.16 X10*3/UL (ref 0–0.7)
EOSINOPHIL NFR BLD AUTO: 1.9 %
ERYTHROCYTE [DISTWIDTH] IN BLOOD BY AUTOMATED COUNT: 12.5 % (ref 11.5–14.5)
ERYTHROCYTE [SEDIMENTATION RATE] IN BLOOD BY WESTERGREN METHOD: 16 MM/H (ref 0–15)
FERRITIN SERPL-MCNC: 441 NG/ML (ref 20–300)
FOLATE SERPL-MCNC: >24 NG/ML
GLUCOSE SERPL-MCNC: 104 MG/DL (ref 74–99)
HAPTOGLOB SERPL NEPH-MCNC: 72 MG/DL (ref 30–200)
HCT VFR BLD AUTO: 47.3 % (ref 41–52)
HGB BLD-MCNC: 15.9 G/DL (ref 13.5–17.5)
HGB RETIC QN: 35 PG (ref 28–38)
IMM GRANULOCYTES # BLD AUTO: 0.05 X10*3/UL (ref 0–0.7)
IMM GRANULOCYTES NFR BLD AUTO: 0.6 % (ref 0–0.9)
IMMATURE RETIC FRACTION: 9.4 %
INR PPP: 1 (ref 0.9–1.1)
IRON SATN MFR SERPL: 35 % (ref 25–45)
IRON SERPL-MCNC: 104 UG/DL (ref 35–150)
LDH SERPL L TO P-CCNC: 139 U/L (ref 84–246)
LYMPHOCYTES # BLD AUTO: 1.76 X10*3/UL (ref 1.2–4.8)
LYMPHOCYTES NFR BLD AUTO: 21.1 %
MCH RBC QN AUTO: 29.9 PG (ref 26–34)
MCHC RBC AUTO-ENTMCNC: 33.6 G/DL (ref 32–36)
MCV RBC AUTO: 89 FL (ref 80–100)
MONOCYTES # BLD AUTO: 0.44 X10*3/UL (ref 0.1–1)
MONOCYTES NFR BLD AUTO: 5.3 %
NEUTROPHILS # BLD AUTO: 5.92 X10*3/UL (ref 1.2–7.7)
NEUTROPHILS NFR BLD AUTO: 70.7 %
NRBC BLD-RTO: NORMAL /100{WBCS}
PLATELET # BLD AUTO: 221 X10*3/UL (ref 150–450)
POTASSIUM SERPL-SCNC: 4 MMOL/L (ref 3.5–5.3)
PROT SERPL-MCNC: 7.9 G/DL (ref 6.4–8.2)
PROTHROMBIN TIME: 11.7 SECONDS (ref 9.8–12.8)
RBC # BLD AUTO: 5.32 X10*6/UL (ref 4.5–5.9)
RETICS #: 0.09 X10*6/UL (ref 0.02–0.12)
RETICS/RBC NFR AUTO: 1.7 % (ref 0.5–2)
SODIUM SERPL-SCNC: 137 MMOL/L (ref 136–145)
TIBC SERPL-MCNC: 300 UG/DL (ref 240–445)
UIBC SERPL-MCNC: 196 UG/DL (ref 110–370)
VIT B12 SERPL-MCNC: 669 PG/ML (ref 211–911)
WBC # BLD AUTO: 8.4 X10*3/UL (ref 4.4–11.3)

## 2024-08-28 PROCEDURE — 82746 ASSAY OF FOLIC ACID SERUM: CPT | Mod: GEALAB

## 2024-08-28 PROCEDURE — 36415 COLL VENOUS BLD VENIPUNCTURE: CPT

## 2024-08-28 PROCEDURE — 85610 PROTHROMBIN TIME: CPT

## 2024-08-28 PROCEDURE — 88185 FLOWCYTOMETRY/TC ADD-ON: CPT | Mod: TC

## 2024-08-28 PROCEDURE — 86140 C-REACTIVE PROTEIN: CPT

## 2024-08-28 PROCEDURE — 82607 VITAMIN B-12: CPT | Mod: GEALAB

## 2024-08-28 PROCEDURE — 99215 OFFICE O/P EST HI 40 MIN: CPT

## 2024-08-28 PROCEDURE — 82668 ASSAY OF ERYTHROPOIETIN: CPT

## 2024-08-28 PROCEDURE — 83520 IMMUNOASSAY QUANT NOS NONAB: CPT

## 2024-08-28 PROCEDURE — 99205 OFFICE O/P NEW HI 60 MIN: CPT

## 2024-08-28 PROCEDURE — 82728 ASSAY OF FERRITIN: CPT

## 2024-08-28 PROCEDURE — 85045 AUTOMATED RETICULOCYTE COUNT: CPT

## 2024-08-28 PROCEDURE — 3008F BODY MASS INDEX DOCD: CPT

## 2024-08-28 PROCEDURE — 85025 COMPLETE CBC W/AUTO DIFF WBC: CPT

## 2024-08-28 PROCEDURE — 83615 LACTATE (LD) (LDH) ENZYME: CPT

## 2024-08-28 PROCEDURE — 83010 ASSAY OF HAPTOGLOBIN QUANT: CPT | Mod: GEALAB

## 2024-08-28 PROCEDURE — 83540 ASSAY OF IRON: CPT

## 2024-08-28 PROCEDURE — 85652 RBC SED RATE AUTOMATED: CPT

## 2024-08-28 PROCEDURE — 80053 COMPREHEN METABOLIC PANEL: CPT

## 2024-08-28 ASSESSMENT — ENCOUNTER SYMPTOMS
OCCASIONAL FEELINGS OF UNSTEADINESS: 0
LOSS OF SENSATION IN FEET: 0
DEPRESSION: 0

## 2024-08-28 ASSESSMENT — COLUMBIA-SUICIDE SEVERITY RATING SCALE - C-SSRS
2. HAVE YOU ACTUALLY HAD ANY THOUGHTS OF KILLING YOURSELF?: NO
1. IN THE PAST MONTH, HAVE YOU WISHED YOU WERE DEAD OR WISHED YOU COULD GO TO SLEEP AND NOT WAKE UP?: NO
6. HAVE YOU EVER DONE ANYTHING, STARTED TO DO ANYTHING, OR PREPARED TO DO ANYTHING TO END YOUR LIFE?: NO

## 2024-08-28 ASSESSMENT — PAIN SCALES - GENERAL: PAINLEVEL: 3

## 2024-08-28 NOTE — PROGRESS NOTES
[facility]    PATIENT VISIT INFORMATION    Visit Type: New Visit    Referring Provider: SULLY Marcano  Reason for referral: Elevated ferritin  Primary Practice Provider: SULLY Marcano    HEMATOLOGY HISTORY    33-year-old male presents for evaluation of elevated ferritin.  Referred by primary practice provider.  Ferritin level 518 noted 1 month ago, no previous comparison.  Ferritin today 441.  Hemochromatosis panel in process.  Family history of paternal uncle with liver cirrhosis following elevation of ferritin discovered on routine blood drive donations.  No other known hemochromatosis genetic mutation and family.  No other abnormalities have been noted on labs.  HISTORY OF PRESENT ILLNESS     ID Statement: Lucas Clayton is a 33-year-old male    Chief Complaint: Elevated Ferritin     Interval History:   Lucas presents for initial evaluation of elevated ferritin levels.    He does note drenching night sweats once or twice a month for the last year. Some neuronopathy in hands, carpel tunnel diagnosis. Right shoulder pain after heavy lifting about a year. Hands joint pain.  Occasional bloody nose. Allergies and asthma history, he feels this is likely related. No fatigue. Tries to eat healthy and walks around the park.  No vigorous exercise.  Does not red meat.  Prefers chicken and turkey.  Drinks muscle milk daily, has not had in 2 weeks.   Denies fevers, recent illness or infections, sores rashes, appetite loss, weight loss, chills, Lymphadenopathy, swelling, SOB, CP, abnormal bowels or bleeding, urinary issues, rashes, bone pain other than sciatic nerve problem the last 3 week.  Denies any bleeding or bruising from any location.    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  -polyphasia      SOCIAL HISTORY  -Lives with partner   -Work place:  recently unemployed   -Tobacco/smokeless use: former quit 2016 about 10 year history    -Alcohol: Denies  -Illicit drug or marijuana use: Denies   -Moravian or Spiritual beliefs: None reported  -Social Determinates of Health Concerns: none reported    FAMILY HISTORY  -Mom- breast cancer metastases at 62 years old passed  -M-Uncle prostate cancer 60's    -P-uncle elevated ferritin found during blood drive donation, liver cancer treated   -No other known history of hematologic, bleeding, clotting, autoimmune, genetic, or malignant disorders in the family.     OCCUPATIONAL/ENVIRONMENTAL HISTORY/EXPOSURES:  -None reported    Active Problems, Allergy List, Medication List, and PMH/PSH/FH/Social Hx have been reviewed and reconciled in chart. Updates made when necessary.     REVIEW OF SYSTEMS   A review of systems has been completed and are negative for complaints except what is stated in the assessment, HPI, IH, ROS, and/or past medical history.    ALLERGIES AND MEDICATIONS     Allergies and Intolerances:   Allergies   Allergen Reactions    Penicillins Anaphylaxis    Doxycycline Rash      Medication Profile:   Current Outpatient Medications   Medication Instructions    albuterol 90 mcg/actuation inhaler 2 puffs, inhalation, Every 6 hours PRN    amphetamine-dextroamphetamine XR (Adderall XR) 20 mg 24 hr capsule 20 mg, oral, Every morning, Do not crush or chew.    busPIRone (BUSPAR) 10 mg, oral, 2 times daily    ergocalciferol (DRISDOL) 50,000 Units, oral, 2 times weekly    FLUoxetine (PROZAC) 20 mg, oral, Daily    fluticasone propion-salmeteroL (Wixela Inhub) 500-50 mcg/dose diskus inhaler 1 puff, inhalation, Every 12 hours    LORazepam (Ativan) 1 mg tablet take one  "tablet by mouth daily as needed for anxiety (#24=30 day supply per prescriber)    montelukast (SINGULAIR) 10 mg, oral, Nightly    multivit with min-folic acid 0.4 mg tablet Every 24 hours    omeprazole (PriLOSEC) 40 mg DR capsule TAKE 1 CAPSULE BY MOUTH 30 MINUTES PRIOR TO MORNING MEAL    tiZANidine (ZANAFLEX) 8 mg, oral, 2 times daily PRN       Available Vaccination Record:     There is no immunization history on file for this patient.   PHYSICAL EXAM     Vital Signs/Measurements:       4/24/2024     1:21 PM 5/23/2024     9:30 AM 6/4/2024     1:20 PM 7/1/2024     7:57 AM 7/3/2024     9:29 AM 7/29/2024     8:29 AM 8/28/2024     8:48 AM   Vitals   Systolic 142 132 118 139 122 145 137   Diastolic 78 68 70 82 76 84 87   Heart Rate 97 96 102 83 87 71 106   Temp       36.3 °C (97.3 °F)   Resp  16 16  18  17   Height (in) 1.829 m (6') 1.829 m (6') 1.829 m (6') 1.829 m (6') 1.829 m (6') 1.829 m (6') 1.823 m (5' 11.77\")    Weight (lb) 298 296 296 316 316 316 318.23   BMI 40.42 kg/m2 40.14 kg/m2 40.14 kg/m2 42.86 kg/m2 42.86 kg/m2 42.86 kg/m2 43.43 kg/m2   BSA (m2) 2.62 m2 2.61 m2 2.61 m2 2.7 m2 2.7 m2 2.7 m2 2.7 m2   Visit Report Report Report Report Report Report Report Report       Significant value        Performance:   ECOG Performance Status: 0     Grade ECOG performance status   0 Fully active, able to carry on all pre-disease performance without restriction   1 Restricted in physically strenuous activity but ambulatory and able to carry out work of a light or sedentary nature, e.g., light housework, office work   2 Ambulatory and capable of all selfcare but unable to carry out any work activities; Up and about more than 50% of waking hours   3 Capable of only limited selfcare, confined to bed or chair more than 50% of waking hours   4 Completely disabled; Cannot carry out any selfcare; Totally confined to bed or chair   5 Dead     Physical Exam:  General: Patient is awake/alert/oriented x3, no distress, Nourished, " hydrated, alert and cooperative, ambulating without difficulty  Skin: Expected color for ethnicity, good turgor, dry, no prominent lesions, rashes, unusual bruising, or bleeding   Hair: Normal texture and distribution   Nails: Normal color, no deformities    HEENT:   Head: Normocephalic, atraumatic, no visible masses, depressions, or scarring   Eyes: Conjunctiva clear, sclera non-icteric, no exudates or hemorrhages   Ears: nl appearance, hearing intact    Nose: no external lesions, mucosa non-inflamed, no rhinorrhea   Mouth: Mucous membranes moist, no lesions, sores, bleeding, or erythema     Head/Neck: Neck supple, no apparent injury, thyroid without mass or tenderness, No JVD, trachea midline, no bruits appreciated    Respiratory/Thorax: Patent airways, CTAB, chest symmetry, normal inspiratory and expiratory effort    Cardiovascular: Regular rate and rhythm, no murmur or gallop, no carotid bruit or thrills    Gastrointestinal: Bowel sounds normal, non-distended, soft, no tenderness, no masses or hernia, or organomegaly appreciated, patient is obese difficult to assess organomegaly   Genitourinary: deferred   Musculoskeletal: Normal gait, normal range of motion, no pain on palpation of spine, no deformity, normal strength for baseline, no atrophy, and no CVA tenderness appreciated   Extremities: No amputations or deformities, cyanosis, edema or viscosities, peripheral pulses intact   Neurological: Sensation present to touch, intact senses, motor response and reflexes normal, normal strength   Breast:    Lymphatic: No significant lymphadenopathy   Psychological: Intact recent and remote memory, judgement, and insight. Appropriate mood, affect, and behavior          RESULTS/DATA     Labs:     Lab Results   Component Value Date    WBC 8.4 08/28/2024    NEUTROABS 5.92 08/28/2024    IGABSOL 0.05 08/28/2024    LYMPHSABS 1.76 08/28/2024    MONOSABS 0.44 08/28/2024    EOSABS 0.16 08/28/2024    BASOSABS 0.03 08/28/2024     RBC 5.32 08/28/2024    MCV 89 08/28/2024    MCHC 33.6 08/28/2024    HGB 15.9 08/28/2024    HCT 47.3 08/28/2024     08/28/2024     Lab Results   Component Value Date    RETICCTPCT 1.7 08/28/2024      Lab Results   Component Value Date    CREATININE 0.80 08/28/2024    BUN 13 08/28/2024    EGFR >90 08/28/2024     08/28/2024    K 4.0 08/28/2024     08/28/2024    CO2 27 08/28/2024      Lab Results   Component Value Date    ALT 38 08/28/2024    AST 20 08/28/2024    ALKPHOS 63 08/28/2024    BILITOT 0.7 08/28/2024      Lab Results   Component Value Date    TSH 2.45 09/03/2021     Lab Results   Component Value Date    TSH 2.45 09/03/2021     Lab Results   Component Value Date    IRON 104 08/28/2024    TIBC 300 08/28/2024    FERRITIN 441 (H) 08/28/2024      Lab Results   Component Value Date    ZVELOABU89 669 08/28/2024      Lab Results   Component Value Date    FOLATE >24.0 08/28/2024     Lab Results   Component Value Date    SEDRATE 16 (H) 08/28/2024      Lab Results   Component Value Date    CRP 0.26 08/28/2024     Lab Results   Component Value Date     08/28/2024     Lab Results   Component Value Date    HAPTOGLOBIN 72 08/28/2024        Radiology/Studies:   Ultrasound of abdomen to assess liver and spleen.    ASSESSMENT/PLAN     Assessment and Plan:   #1.  Elevated ferritin  33-year-old male presents for evaluation of elevated ferritin.  Referred by primary practice provider.  Ferritin level 518 noted 1 month ago, no previous comparison.  Ferritin today 441.  Hemochromatosis panel in process.  Family history of paternal uncle with liver cirrhosis following elevation of ferritin discovered on routine blood drive donations.  No other known hemochromatosis genetic mutation and family.  No other abnormalities have been noted on labs.    Hereditary hemochromatosis, flow cytometry in process.      **Please follow with specialties as scheduled for other comorbidities and routine health  screenings.**    I have reviewed the patient's medical record including provider notes, laboratory and testing results, imaging, and procedures available within the system and outside the system pertinent to patient care.     Follow up:    RTC:  -2 to 3 weeks virtually to review workup    Medications:  -N/A    Imaging/Testing:  -Ultrasound of abdomen to assess liver and spleen    Referral:  -N/A    Other Pertinent Appointments:  -Pain management 9/9/2024      Patient Discussion Summary:  Discussed plan of care in detail. Patient states understanding and in agreement. Answered all questions. They will call with any additional questions and/or concerns.    Thank you for allowing me to participate in your care. It was a pleasure meeting you.    Sincerely,  Krupa Johns, APRN-CNP       This document may have been written by voice recognition software.  There may be some incorrect wording, spelling and/or spelling errors or punctuation errors that were not corrected prior to committing the note to the medical record. Please request clarification if there is documentation error or clarification is needed.   Time based billing: Please see documentation within this specific encounter.

## 2024-08-30 ENCOUNTER — HOSPITAL ENCOUNTER (OUTPATIENT)
Dept: RADIOLOGY | Facility: CLINIC | Age: 34
End: 2024-08-30

## 2024-08-30 LAB — EPO SERPL-ACNC: 10 MU/ML (ref 4–27)

## 2024-08-31 LAB — SOL IL2 RECEP SERPL-MCNC: 279.5 PG/ML (ref 175.3–858.2)

## 2024-09-01 LAB
CELL COUNT (BLOOD): 8.4 X10*3/UL
CELL POPULATIONS: NORMAL
CYTOGENETICS/MOLECULAR TEST ORDERED: NORMAL
DIAGNOSIS: NORMAL
FLOW DIFFERENTIAL: NORMAL
FLOW TEST ORDERED: NORMAL
LAB TEST METHOD: NORMAL
NUMBER OF CELLS COLLECTED: NORMAL PER TUBE
PATH REPORT.TOTAL CANCER: NORMAL
RBC MORPH BLD: NORMAL
SIGNATURE COMMENT: NORMAL
SPECIMEN VIABILITY: NORMAL
WBC MORPH BLD: NORMAL

## 2024-09-03 LAB
ELECTRONICALLY SIGNED BY: NORMAL
ELECTRONICALLY SIGNED BY: NORMAL
HFE GENE MUT TESTED BLD/T: NORMAL
HFE P.C282Y BLD/T QL: NORMAL
HFE P.H63D BLD/T QL: NORMAL
MYELOID NGS RESULTS: NORMAL

## 2024-09-04 LAB
CHROM ANALY OVERALL INTERP-IMP: NORMAL
ELECTRONICALLY SIGNED BY CYTOGENETICS: NORMAL

## 2024-09-05 ENCOUNTER — HOSPITAL ENCOUNTER (OUTPATIENT)
Dept: RADIOLOGY | Facility: CLINIC | Age: 34
Discharge: HOME | End: 2024-09-05
Payer: MEDICARE

## 2024-09-05 DIAGNOSIS — R79.89 ELEVATED FERRITIN: ICD-10-CM

## 2024-09-05 PROCEDURE — 76700 US EXAM ABDOM COMPLETE: CPT | Performed by: RADIOLOGY

## 2024-09-05 PROCEDURE — 76700 US EXAM ABDOM COMPLETE: CPT

## 2024-09-09 ENCOUNTER — TELEMEDICINE (OUTPATIENT)
Dept: PAIN MEDICINE | Facility: CLINIC | Age: 34
End: 2024-09-09
Payer: MEDICARE

## 2024-09-09 VITALS — BODY MASS INDEX: 43.78 KG/M2 | HEIGHT: 71 IN | WEIGHT: 312.7 LBS

## 2024-09-09 DIAGNOSIS — R73.03 PREDIABETES: Primary | ICD-10-CM

## 2024-09-09 DIAGNOSIS — R73.01 ABNORMAL FASTING GLUCOSE: ICD-10-CM

## 2024-09-09 DIAGNOSIS — E66.9 ABDOMINAL OBESITY AND METABOLIC SYNDROME: ICD-10-CM

## 2024-09-09 DIAGNOSIS — Z71.3 DIETARY COUNSELING: ICD-10-CM

## 2024-09-09 DIAGNOSIS — E78.2 MIXED HYPERLIPIDEMIA: ICD-10-CM

## 2024-09-09 DIAGNOSIS — E88.810 ABDOMINAL OBESITY AND METABOLIC SYNDROME: ICD-10-CM

## 2024-09-09 DIAGNOSIS — E16.1 HYPERINSULINEMIA: ICD-10-CM

## 2024-09-09 DIAGNOSIS — R63.2 POLYPHAGIA: ICD-10-CM

## 2024-09-09 DIAGNOSIS — Z71.82 EXERCISE COUNSELING: ICD-10-CM

## 2024-09-09 PROCEDURE — 3008F BODY MASS INDEX DOCD: CPT | Performed by: NURSE PRACTITIONER

## 2024-09-09 PROCEDURE — 99401 PREV MED CNSL INDIV APPRX 15: CPT | Performed by: NURSE PRACTITIONER

## 2024-09-09 PROCEDURE — 99214 OFFICE O/P EST MOD 30 MIN: CPT | Performed by: NURSE PRACTITIONER

## 2024-09-09 PROCEDURE — 1036F TOBACCO NON-USER: CPT | Performed by: NURSE PRACTITIONER

## 2024-09-09 RX ORDER — DULAGLUTIDE 0.75 MG/.5ML
0.75 INJECTION, SOLUTION SUBCUTANEOUS WEEKLY
Qty: 2 ML | Refills: 0 | Status: SHIPPED | OUTPATIENT
Start: 2024-09-09

## 2024-09-09 NOTE — ASSESSMENT & PLAN NOTE
Orders:    dulaglutide (Trulicity) 0.75 mg/0.5 mL pen injector; Inject 0.75 mg under the skin 1 (one) time per week.

## 2024-09-09 NOTE — PROGRESS NOTES
"Subjective   Patient ID: Lucas Clayton is a 33 y.o. male who presents for Weight Management.     Virtual or Telephone Consent    An interactive audio and video telecommunication system which permits real time communications between the patient (at the originating site) and provider (at the distant site) was utilized to provide this telehealth service.   Verbal consent was requested and obtained from Lucas Clayton on this date, 09/09/24 for a telehealth visit.     HPI 32 YO Male with Chronic Right Shoulder Pain, Tendinopathy of the right rotator cuff and Primary OA of the right shoulder. He presents for a Weight Management. Lucas was seen for follow-up last month and it was determined he had not fully fulfilled the required \"good darian effort\" prior to the initiation of anti-obesity medications. He was, again, given diet and exercise recommendations. He was asked to implement these changes over the next 1-2 months and return, having lost some weight, to further discuss anti-obesity medications. Since last month, he has been keeping a food diary and has been averaging about 5925-0230 calories per day. He has been weighing and measuring portion sizes.     Weight Hx:  Initial Weight: 316  Last Visit Weight: 316  Current Weight: 312.7  Total Loss: 3.3lbs      Diet Recall:  Breakfast: 2 eggs and piece of chan  Lunch: Turkey on flat bread  Dinner: chicken parmesan   Snack(s): none     Current Exercise Routine: walking    Review of Systems Unless noted in the HPI all other systems have been reviewed and are negative for complaint.     Objective   Physical Exam Telehealth Visit:  General- No acute distress, well appearing and well nourished.   Eyes Conjunctiva and lids: No erythema, swelling or discharge  Pulmonary - Respiratory effort: Normal respiration.   Neurologic - Coordination: Normal. A&Ox3.  Psychiatric - Orientation to person, place, and time: Normal. Mood and affect: Normal.    Assessment/Plan   Assessment & " Plan  Prediabetes    Orders:    dulaglutide (Trulicity) 0.75 mg/0.5 mL pen injector; Inject 0.75 mg under the skin 1 (one) time per week.    Mixed hyperlipidemia         Hyperinsulinemia    Orders:    dulaglutide (Trulicity) 0.75 mg/0.5 mL pen injector; Inject 0.75 mg under the skin 1 (one) time per week.    Abdominal obesity and metabolic syndrome    Orders:    dulaglutide (Trulicity) 0.75 mg/0.5 mL pen injector; Inject 0.75 mg under the skin 1 (one) time per week.    Exercise counseling         Polyphagia         Dietary counseling         Abnormal fasting glucose    Orders:    dulaglutide (Trulicity) 0.75 mg/0.5 mL pen injector; Inject 0.75 mg under the skin 1 (one) time per week.    TREATMENT PLAN:  32 YO Make with Obesity here for Weight Management.   Current BMI: 43  Total Weight Loss: 3.3 lbs  Counseled Heavily on Diet and Exercise.  Labs personally reviewed with patient.   Vitamin D deficiency--> Vitamin D is 17.  Will send Rx for supplementation x 12 weeks then recheck.  Abnormal lipid panel--> Elevated TC (208), Elevated TC:HDL Ratio (4.9), Elevated LDL (141).  Hyperinsulinemia--> Fasting insulin 31  Prediabetes--> A1C 5.7%  Elevated Ferritin--> 518.  Whereas ferritin can be elevated in the setting of metabolic syndrome, 518 is quite elevated.  I will place a referral to hematology for further evaluation.  Elevated fasting glucose--> 102  CLARENCE-IR Score= 7.8 (>1.6 suggests IR/Metabolic Syndrome).   Discussed medications to assist with insulin resistance and appetite suppression.   After discussing all medication options, we decided to start Trulicity.  Denies personal or family hx of MTC, MENII or Pancreatitis.   Risks and benefits discussed.   > 50% of time spent counseling on the importance of following recommended dietary modifications including: role of diet, low calorie and carbohydrate restrictions, limiting fast food and avoiding high sugary beverages.   Also discussed, the role of exercise with an  ultimate goal of at least 250-300 minutes a week for weight loss and weight maintenance.  Follow-up in 1 month.

## 2024-09-09 NOTE — PATIENT INSTRUCTIONS
Medication:  You have been prescribed Trulicity, off-label, for the treatment of Insulin Resistance and Metabolic Syndrome.  Trulicity is indicated as an adjunct to a reduced calorie diet and increased physical activity.  This medication works by decreasing the appetite and increased feeling of fullness.   Administer Trulicity once weekly, on the same day each week, at any time of day, with or without meals.   Inject subcutaneously in the abdomen, thigh or upper arm.    Possible Side Effects Include: Nausea, Diarrhea, Vomiting, Constipation, Abdominal Pain, Headache, Fatigue, Dyspepsia, Dizziness, Abdominal Distention, Hypoglycemia (in patients with Type II Diabetes), flatulence, gastroenteritis, and GERD.    *For Constipation--> take a fiber supplement or stool softener daily. Make sure you are drinking at least 64oz of water daily.  *For Nausea--> Eat small, frequent meals throughout the day. If nausea persists, please call the office.    Diet Recommendations:  - Keep a food journal and log everything you eat and drink. You can use a phone applications like [employer], [employer], a notebook, or the provided meal calendar.   - Eat between 5083-2607 calories per day; meal plan provided to you this visit.  - Consume less than 100 carbohydrates per day. Examples of carbohydrates include: bread, pasta, cakes, cookies, rice, cereal, fruit drinks, juice, soda and fruit.   - Consume no more than 1 fruit per day.   - Eat 3 meals and 1 snack. Each meal should have 3-4 oz of protein and vegetables. Limit starches.  - Eat on a schedule and do not skip meals.    Exercise Recommendations:   - Aim for 30 minutes per day, 5 days per week to start, with progression over several weeks to more vigorous intensity.   - Emphasize increasing duration, rather than intensity initially.   - Moderate-intensity physical activity includes:  * Brisk Walking  * Biking (slower than 10 MPH)  * Water Aerobics  * Vigorous housework (washing  windows, vacuuming, mopping)  * Mowing the lawn (push mower)  * Gardening  * Ballroom dancing    Follow-up in 1 month.

## 2024-09-16 ENCOUNTER — TELEMEDICINE (OUTPATIENT)
Dept: HEMATOLOGY/ONCOLOGY | Facility: HOSPITAL | Age: 34
End: 2024-09-16
Payer: COMMERCIAL

## 2024-09-16 DIAGNOSIS — R79.89 ELEVATED FERRITIN: ICD-10-CM

## 2024-09-16 DIAGNOSIS — K76.0 FATTY LIVER: Primary | ICD-10-CM

## 2024-09-16 PROCEDURE — 99214 OFFICE O/P EST MOD 30 MIN: CPT

## 2024-09-16 NOTE — PROGRESS NOTES
[facility]    PATIENT VISIT INFORMATION    Visit Type: Follow up  I performed this visit using real-time telehealth tools, including an audio/video connection between (Lucas Clayton at his home) and (Krupa Johns CNP [facility] remote)  Patient consents to telemedicine service today and understands the limitations of the visit, no physical examination and all issues may not be able to be addressed today, other than brief neuro and psych assessment.     Referring Provider: SULLY Marcano  Reason for referral: Elevated ferritin  Primary Practice Provider: SULLY Marcano    HEMATOLOGY HISTORY    33-year-old male presents for evaluation of elevated ferritin.  Referred by primary practice provider.  Ferritin level 518 noted 1 month ago, no previous comparison.  Ferritin today 441.  Hemochromatosis panel in process.  Family history of paternal uncle with liver cirrhosis following elevation of ferritin discovered on routine blood drive donations.  No other known hemochromatosis genetic mutation and family.  No other abnormalities have been noted on labs.  HISTORY OF PRESENT ILLNESS     ID Statement: Lucas Clayton is a 33-year-old male    Chief Complaint: Elevated Ferritin     Interval History:   Lucas presents for follow up.  He does note drenching night sweats once or twice a month for the last year. Some neuronopathy in hands, carpel tunnel diagnosis. Right shoulder pain after heavy lifting about a year. Hands joint pain.  Occasional bloody nose. Allergies and asthma history, he feels this is likely related. No fatigue. Tries to eat healthy and walks around the park.  No vigorous exercise.  Does not red meat.  Prefers chicken and turkey.  Drinks muscle milk daily, has not had in 2 weeks.   Denies fevers, recent illness or infections, sores rashes, appetite loss, weight loss, chills, Lymphadenopathy, swelling, SOB, CP, abnormal bowels or bleeding, urinary issues,  rashes, bone pain other than sciatic nerve problem the last 3 week.  Denies any bleeding or bruising from any location.    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  -polyphasia      SOCIAL HISTORY  -Lives with partner   -Work place:  recently unemployed   -Tobacco/smokeless use: former quit 2016 about 10 year history    -Alcohol: Denies  -Illicit drug or marijuana use: Denies   -Mu-ism or Spiritual beliefs: None reported  -Social Determinates of Health Concerns: none reported    FAMILY HISTORY  -Mom- breast cancer metastases at 62 years old passed  -M-Uncle prostate cancer 60's    -P-uncle elevated ferritin found during blood drive donation, liver cancer treated   -No other known history of hematologic, bleeding, clotting, autoimmune, genetic, or malignant disorders in the family.     OCCUPATIONAL/ENVIRONMENTAL HISTORY/EXPOSURES:  -None reported    Active Problems, Allergy List, Medication List, and PMH/PSH/FH/Social Hx have been reviewed and reconciled in chart. Updates made when necessary.     REVIEW OF SYSTEMS   A review of systems has been completed and are negative for complaints except what is stated in the assessment, HPI, IH, ROS, and/or past medical history.    ALLERGIES AND MEDICATIONS     Allergies and Intolerances:   Allergies   Allergen Reactions    Penicillins Anaphylaxis    Doxycycline Rash      Medication Profile:   Current Outpatient Medications   Medication Instructions    albuterol 90 mcg/actuation inhaler 2 puffs, inhalation, Every 6 hours PRN    amphetamine-dextroamphetamine XR (Adderall XR) 20 mg 24 hr capsule 20 mg, oral, Every  "morning, Do not crush or chew.    busPIRone (BUSPAR) 10 mg, oral, 2 times daily    ergocalciferol (DRISDOL) 50,000 Units, oral, 2 times weekly    FLUoxetine (PROZAC) 20 mg, oral, Daily    fluticasone propion-salmeteroL (Wixela Inhub) 500-50 mcg/dose diskus inhaler 1 puff, inhalation, Every 12 hours    LORazepam (Ativan) 1 mg tablet take one tablet by mouth daily as needed for anxiety (#24=30 day supply per prescriber)    montelukast (SINGULAIR) 10 mg, oral, Nightly    multivit with min-folic acid 0.4 mg tablet Every 24 hours    nabumetone (RELAFEN) 1,000 mg, oral, 2 times daily PRN    omeprazole (PriLOSEC) 40 mg DR capsule TAKE 1 CAPSULE BY MOUTH 30 MINUTES PRIOR TO MORNING MEAL    tiZANidine (ZANAFLEX) 8 mg, oral, 2 times daily PRN    Trulicity 0.75 mg, subcutaneous, Weekly       Available Vaccination Record:     There is no immunization history on file for this patient.   PHYSICAL EXAM     Vital Signs/Measurements:       5/23/2024     9:30 AM 6/4/2024     1:20 PM 7/1/2024     7:57 AM 7/3/2024     9:29 AM 7/29/2024     8:29 AM 8/28/2024     8:48 AM 9/9/2024    11:20 AM   Vitals   Systolic 132 118 139 122 145 137    Diastolic 68 70 82 76 84 87    Heart Rate 96 102 83 87 71 106    Temp      36.3 °C (97.3 °F)    Resp 16 16  18  17    Height (in) 1.829 m (6') 1.829 m (6') 1.829 m (6') 1.829 m (6') 1.829 m (6') 1.823 m (5' 11.77\")  1.803 m (5' 11\")   Weight (lb) 296 296 316 316 316 318.23 312.7   BMI 40.14 kg/m2 40.14 kg/m2 42.86 kg/m2 42.86 kg/m2 42.86 kg/m2 43.43 kg/m2 43.61 kg/m2   BSA (m2) 2.61 m2 2.61 m2 2.7 m2 2.7 m2 2.7 m2 2.7 m2 2.67 m2   Visit Report Report Report Report Report Report Report        Significant value        Performance:   ECOG Performance Status: 0     Grade ECOG performance status   0 Fully active, able to carry on all pre-disease performance without restriction   1 Restricted in physically strenuous activity but ambulatory and able to carry out work of a light or sedentary nature, e.g., light " housework, office work   2 Ambulatory and capable of all selfcare but unable to carry out any work activities; Up and about more than 50% of waking hours   3 Capable of only limited selfcare, confined to bed or chair more than 50% of waking hours   4 Completely disabled; Cannot carry out any selfcare; Totally confined to bed or chair   5 Dead     Physical Exam:  General: Patient is awake/alert/oriented x3, no distress   Psychological: Intact recent and remote memory, judgement, and insight. Appropriate mood, affect, and behavior     RESULTS/DATA     Labs:     Lab Results   Component Value Date    WBC 8.4 08/28/2024    NEUTROABS 5.92 08/28/2024    IGABSOL 0.05 08/28/2024    LYMPHSABS 1.76 08/28/2024    MONOSABS 0.44 08/28/2024    EOSABS 0.16 08/28/2024    BASOSABS 0.03 08/28/2024    RBC 5.32 08/28/2024    MCV 89 08/28/2024    MCHC 33.6 08/28/2024    HGB 15.9 08/28/2024    HCT 47.3 08/28/2024     08/28/2024     Lab Results   Component Value Date    RETICCTPCT 1.7 08/28/2024      Lab Results   Component Value Date    CREATININE 0.80 08/28/2024    BUN 13 08/28/2024    EGFR >90 08/28/2024     08/28/2024    K 4.0 08/28/2024     08/28/2024    CO2 27 08/28/2024      Lab Results   Component Value Date    ALT 38 08/28/2024    AST 20 08/28/2024    ALKPHOS 63 08/28/2024    BILITOT 0.7 08/28/2024      Lab Results   Component Value Date    TSH 2.45 09/03/2021     Lab Results   Component Value Date    TSH 2.45 09/03/2021     Lab Results   Component Value Date    IRON 104 08/28/2024    TIBC 300 08/28/2024    FERRITIN 441 (H) 08/28/2024      Lab Results   Component Value Date    VYEEDEET72 669 08/28/2024      Lab Results   Component Value Date    FOLATE >24.0 08/28/2024     Lab Results   Component Value Date    SEDRATE 16 (H) 08/28/2024      Lab Results   Component Value Date    CRP 0.26 08/28/2024     Lab Results   Component Value Date     08/28/2024     Lab Results   Component Value Date    HAPTOGLOBIN 72  08/28/2024        Radiology/Studies:   US abdomen complete  9/6/2024  IMPRESSION:  Hepatic steatosis. Poorly visualized pancreas.      Otherwise, grossly unremarkable ultrasound of the abdomen.        ASSESSMENT/PLAN     Assessment and Plan:   #1.  Elevated ferritin  33-year-old male presents for evaluation of elevated ferritin.  Referred by primary practice provider.  Ferritin level 518 noted 1 month ago, no previous comparison.  Ferritin today 441.  Hemochromatosis panel in process.  Family history of paternal uncle with liver cirrhosis following elevation of ferritin discovered on routine blood drive donations.  No other known hemochromatosis genetic mutation and family.  No other abnormalities have been noted on labs.    Hereditary hemochromatosis, flow cytometry in process.  Hereditary hemochromatosis panel is negative.  Flow cytometry does not show any immunophenotypic of lymphoproliferative disorder.  Ferritin seems to be trending down.  All other labs are unremarkable.  We will follow in 3 months and see if ferritin decreases further.  Discussed ultrasound showing fatty liver referral to hepatology.  Discussed healthy lifestyle, diet and exercise.  Encouraged this change.  Advised the ferritin persists he should have a colonoscopy.  Additionally, discussed ferritin can be an acute phase reactant.  Patient does have musculoskeletal pain and carpal tunnel.  He follows pain management.    **Please follow with specialties as scheduled for other comorbidities and routine health screenings.**    I have reviewed the patient's medical record including provider notes, laboratory and testing results, imaging, and procedures available within the system and outside the system pertinent to patient care.     Follow up:    RTC:  - 3 months with labs    Medications:  -N/A    Imaging/Testing:  -N/A    Referral:  -Hepatology    Other Pertinent Appointments:  -Pain management 10/7/2024      Patient Discussion  Summary:  Discussed plan of care in detail. Patient states understanding and in agreement. Answered all questions. They will call with any additional questions and/or concerns.    Thank you for allowing me to participate in your care. It was a pleasure meeting you.    Sincerely,  Krupa Johns, APRN-CNP       This document may have been written by voice recognition software.  There may be some incorrect wording, spelling and/or spelling errors or punctuation errors that were not corrected prior to committing the note to the medical record. Please request clarification if there is documentation error or clarification is needed.   Time based billing: Please see documentation within this specific encounter.

## 2024-10-07 ENCOUNTER — APPOINTMENT (OUTPATIENT)
Dept: PAIN MEDICINE | Facility: CLINIC | Age: 34
End: 2024-10-07
Payer: MEDICARE

## 2024-10-09 DIAGNOSIS — M25.511 CHRONIC RIGHT SHOULDER PAIN: Primary | ICD-10-CM

## 2024-10-09 DIAGNOSIS — G89.29 CHRONIC RIGHT SHOULDER PAIN: Primary | ICD-10-CM

## 2024-10-22 DIAGNOSIS — R79.89 OTHER SPECIFIED ABNORMAL FINDINGS OF BLOOD CHEMISTRY: ICD-10-CM

## 2024-10-22 DIAGNOSIS — R51.9 HEADACHE: ICD-10-CM

## 2024-10-22 DIAGNOSIS — R68.89 OTHER GENERAL SYMPTOMS AND SIGNS: ICD-10-CM

## 2024-10-22 DIAGNOSIS — G47.00 INSOMNIA, UNSPECIFIED: ICD-10-CM

## 2024-10-22 DIAGNOSIS — E29.1 TESTICULAR HYPOFUNCTION: Primary | ICD-10-CM

## 2024-10-22 DIAGNOSIS — R63.5 ABNORMAL WEIGHT GAIN: ICD-10-CM

## 2024-10-22 DIAGNOSIS — E07.9 DISORDER OF THYROID, UNSPECIFIED: ICD-10-CM

## 2024-12-18 ENCOUNTER — APPOINTMENT (OUTPATIENT)
Dept: HEMATOLOGY/ONCOLOGY | Facility: CLINIC | Age: 34
End: 2024-12-18
Payer: COMMERCIAL